# Patient Record
Sex: MALE | Race: WHITE | NOT HISPANIC OR LATINO | Employment: FULL TIME | URBAN - METROPOLITAN AREA
[De-identification: names, ages, dates, MRNs, and addresses within clinical notes are randomized per-mention and may not be internally consistent; named-entity substitution may affect disease eponyms.]

---

## 2024-06-09 ENCOUNTER — HOSPITAL ENCOUNTER (INPATIENT)
Facility: HOSPITAL | Age: 59
LOS: 1 days | Discharge: ANOTHER HOSPITAL | DRG: 270 | End: 2024-06-10
Attending: EMERGENCY MEDICINE | Admitting: STUDENT IN AN ORGANIZED HEALTH CARE EDUCATION/TRAINING PROGRAM
Payer: COMMERCIAL

## 2024-06-09 ENCOUNTER — APPOINTMENT (OUTPATIENT)
Dept: CT IMAGING | Facility: HOSPITAL | Age: 59
DRG: 270 | End: 2024-06-09
Payer: COMMERCIAL

## 2024-06-09 ENCOUNTER — APPOINTMENT (OUTPATIENT)
Dept: GENERAL RADIOLOGY | Facility: HOSPITAL | Age: 59
DRG: 270 | End: 2024-06-09
Payer: COMMERCIAL

## 2024-06-09 DIAGNOSIS — I21.4 NSTEMI (NON-ST ELEVATED MYOCARDIAL INFARCTION): Primary | ICD-10-CM

## 2024-06-09 LAB
ALBUMIN SERPL-MCNC: 4.4 G/DL (ref 3.5–5.2)
ALBUMIN/GLOB SERPL: 2.1 G/DL
ALP SERPL-CCNC: 67 U/L (ref 39–117)
ALT SERPL W P-5'-P-CCNC: 41 U/L (ref 1–41)
ANION GAP SERPL CALCULATED.3IONS-SCNC: 15.4 MMOL/L (ref 5–15)
APTT PPP: 24.5 SECONDS (ref 78–95.9)
AST SERPL-CCNC: 41 U/L (ref 1–40)
BASOPHILS # BLD AUTO: 0.06 10*3/MM3 (ref 0–0.2)
BASOPHILS NFR BLD AUTO: 0.4 % (ref 0–1.5)
BILIRUB SERPL-MCNC: 0.8 MG/DL (ref 0–1.2)
BUN SERPL-MCNC: 17 MG/DL (ref 6–20)
BUN/CREAT SERPL: 12.3 (ref 7–25)
CALCIUM SPEC-SCNC: 9.5 MG/DL (ref 8.6–10.5)
CHLORIDE SERPL-SCNC: 103 MMOL/L (ref 98–107)
CO2 SERPL-SCNC: 23.6 MMOL/L (ref 22–29)
CREAT SERPL-MCNC: 1.38 MG/DL (ref 0.76–1.27)
DEPRECATED RDW RBC AUTO: 37.7 FL (ref 37–54)
EGFRCR SERPLBLD CKD-EPI 2021: 59.6 ML/MIN/1.73
EOSINOPHIL # BLD AUTO: 0.06 10*3/MM3 (ref 0–0.4)
EOSINOPHIL NFR BLD AUTO: 0.4 % (ref 0.3–6.2)
ERYTHROCYTE [DISTWIDTH] IN BLOOD BY AUTOMATED COUNT: 12.5 % (ref 12.3–15.4)
GEN 5 2HR TROPONIN T REFLEX: 154 NG/L
GLOBULIN UR ELPH-MCNC: 2.1 GM/DL
GLUCOSE SERPL-MCNC: 120 MG/DL (ref 65–99)
HCT VFR BLD AUTO: 43.8 % (ref 37.5–51)
HGB BLD-MCNC: 15.3 G/DL (ref 13–17.7)
HOLD SPECIMEN: NORMAL
HOLD SPECIMEN: NORMAL
IMM GRANULOCYTES # BLD AUTO: 0.05 10*3/MM3 (ref 0–0.05)
IMM GRANULOCYTES NFR BLD AUTO: 0.4 % (ref 0–0.5)
INR PPP: 1.03 (ref 0.86–1.15)
LIPASE SERPL-CCNC: 51 U/L (ref 13–60)
LYMPHOCYTES # BLD AUTO: 1.81 10*3/MM3 (ref 0.7–3.1)
LYMPHOCYTES NFR BLD AUTO: 12.9 % (ref 19.6–45.3)
MAGNESIUM SERPL-MCNC: 1.8 MG/DL (ref 1.6–2.6)
MCH RBC QN AUTO: 29.1 PG (ref 26.6–33)
MCHC RBC AUTO-ENTMCNC: 34.9 G/DL (ref 31.5–35.7)
MCV RBC AUTO: 83.3 FL (ref 79–97)
MONOCYTES # BLD AUTO: 0.92 10*3/MM3 (ref 0.1–0.9)
MONOCYTES NFR BLD AUTO: 6.6 % (ref 5–12)
NEUTROPHILS NFR BLD AUTO: 11.13 10*3/MM3 (ref 1.7–7)
NEUTROPHILS NFR BLD AUTO: 79.3 % (ref 42.7–76)
NRBC BLD AUTO-RTO: 0 /100 WBC (ref 0–0.2)
NT-PROBNP SERPL-MCNC: 191.2 PG/ML (ref 0–900)
PLATELET # BLD AUTO: 223 10*3/MM3 (ref 140–450)
PMV BLD AUTO: 10.2 FL (ref 6–12)
POTASSIUM SERPL-SCNC: 4.1 MMOL/L (ref 3.5–5.2)
PROT SERPL-MCNC: 6.5 G/DL (ref 6–8.5)
PROTHROMBIN TIME: 13.8 SECONDS (ref 11.8–14.9)
RBC # BLD AUTO: 5.26 10*6/MM3 (ref 4.14–5.8)
SODIUM SERPL-SCNC: 142 MMOL/L (ref 136–145)
TROPONIN T DELTA: 83 NG/L
TROPONIN T SERPL HS-MCNC: 71 NG/L
TSH SERPL DL<=0.05 MIU/L-ACNC: 1.03 UIU/ML (ref 0.27–4.2)
WBC NRBC COR # BLD AUTO: 14.03 10*3/MM3 (ref 3.4–10.8)
WHOLE BLOOD HOLD COAG: NORMAL
WHOLE BLOOD HOLD SPECIMEN: NORMAL
WHOLE BLOOD HOLD SPECIMEN: NORMAL

## 2024-06-09 PROCEDURE — 25510000001 IOPAMIDOL PER 1 ML: Performed by: EMERGENCY MEDICINE

## 2024-06-09 PROCEDURE — 94799 UNLISTED PULMONARY SVC/PX: CPT

## 2024-06-09 PROCEDURE — 85610 PROTHROMBIN TIME: CPT | Performed by: EMERGENCY MEDICINE

## 2024-06-09 PROCEDURE — 36415 COLL VENOUS BLD VENIPUNCTURE: CPT | Performed by: STUDENT IN AN ORGANIZED HEALTH CARE EDUCATION/TRAINING PROGRAM

## 2024-06-09 PROCEDURE — 80050 GENERAL HEALTH PANEL: CPT | Performed by: EMERGENCY MEDICINE

## 2024-06-09 PROCEDURE — 83690 ASSAY OF LIPASE: CPT | Performed by: EMERGENCY MEDICINE

## 2024-06-09 PROCEDURE — 84484 ASSAY OF TROPONIN QUANT: CPT | Performed by: EMERGENCY MEDICINE

## 2024-06-09 PROCEDURE — 93010 ELECTROCARDIOGRAM REPORT: CPT | Performed by: INTERNAL MEDICINE

## 2024-06-09 PROCEDURE — 99291 CRITICAL CARE FIRST HOUR: CPT

## 2024-06-09 PROCEDURE — 71045 X-RAY EXAM CHEST 1 VIEW: CPT

## 2024-06-09 PROCEDURE — 83036 HEMOGLOBIN GLYCOSYLATED A1C: CPT | Performed by: STUDENT IN AN ORGANIZED HEALTH CARE EDUCATION/TRAINING PROGRAM

## 2024-06-09 PROCEDURE — 93005 ELECTROCARDIOGRAM TRACING: CPT

## 2024-06-09 PROCEDURE — 84484 ASSAY OF TROPONIN QUANT: CPT | Performed by: STUDENT IN AN ORGANIZED HEALTH CARE EDUCATION/TRAINING PROGRAM

## 2024-06-09 PROCEDURE — 25010000002 HYDROMORPHONE 1 MG/ML SOLUTION: Performed by: STUDENT IN AN ORGANIZED HEALTH CARE EDUCATION/TRAINING PROGRAM

## 2024-06-09 PROCEDURE — 71275 CT ANGIOGRAPHY CHEST: CPT

## 2024-06-09 PROCEDURE — 93005 ELECTROCARDIOGRAM TRACING: CPT | Performed by: EMERGENCY MEDICINE

## 2024-06-09 PROCEDURE — 83735 ASSAY OF MAGNESIUM: CPT | Performed by: EMERGENCY MEDICINE

## 2024-06-09 PROCEDURE — 25010000002 HEPARIN (PORCINE) 25000-0.45 UT/250ML-% SOLUTION: Performed by: EMERGENCY MEDICINE

## 2024-06-09 PROCEDURE — 25810000003 SODIUM CHLORIDE 0.9 % SOLUTION: Performed by: EMERGENCY MEDICINE

## 2024-06-09 PROCEDURE — 85730 THROMBOPLASTIN TIME PARTIAL: CPT | Performed by: EMERGENCY MEDICINE

## 2024-06-09 PROCEDURE — 99222 1ST HOSP IP/OBS MODERATE 55: CPT | Performed by: STUDENT IN AN ORGANIZED HEALTH CARE EDUCATION/TRAINING PROGRAM

## 2024-06-09 PROCEDURE — 83880 ASSAY OF NATRIURETIC PEPTIDE: CPT | Performed by: EMERGENCY MEDICINE

## 2024-06-09 RX ORDER — ONDANSETRON 2 MG/ML
4 INJECTION INTRAMUSCULAR; INTRAVENOUS EVERY 6 HOURS PRN
Status: DISCONTINUED | OUTPATIENT
Start: 2024-06-09 | End: 2024-06-10 | Stop reason: HOSPADM

## 2024-06-09 RX ORDER — NALOXONE HCL 0.4 MG/ML
0.4 VIAL (ML) INJECTION
Status: DISCONTINUED | OUTPATIENT
Start: 2024-06-09 | End: 2024-06-10 | Stop reason: HOSPADM

## 2024-06-09 RX ORDER — SODIUM CHLORIDE 9 MG/ML
250 INJECTION, SOLUTION INTRAVENOUS CONTINUOUS
Status: DISCONTINUED | OUTPATIENT
Start: 2024-06-09 | End: 2024-06-10

## 2024-06-09 RX ORDER — AMOXICILLIN 250 MG
2 CAPSULE ORAL 2 TIMES DAILY PRN
Status: DISCONTINUED | OUTPATIENT
Start: 2024-06-09 | End: 2024-06-10 | Stop reason: HOSPADM

## 2024-06-09 RX ORDER — ATORVASTATIN CALCIUM 40 MG/1
40 TABLET, FILM COATED ORAL NIGHTLY
Status: DISCONTINUED | OUTPATIENT
Start: 2024-06-09 | End: 2024-06-10 | Stop reason: HOSPADM

## 2024-06-09 RX ORDER — NITROGLYCERIN 0.4 MG/1
0.4 TABLET SUBLINGUAL
Status: DISCONTINUED | OUTPATIENT
Start: 2024-06-09 | End: 2024-06-10 | Stop reason: HOSPADM

## 2024-06-09 RX ORDER — SODIUM CHLORIDE 9 MG/ML
40 INJECTION, SOLUTION INTRAVENOUS AS NEEDED
Status: DISCONTINUED | OUTPATIENT
Start: 2024-06-09 | End: 2024-06-10 | Stop reason: HOSPADM

## 2024-06-09 RX ORDER — FAMOTIDINE 20 MG/1
40 TABLET, FILM COATED ORAL DAILY
Status: DISCONTINUED | OUTPATIENT
Start: 2024-06-10 | End: 2024-06-10

## 2024-06-09 RX ORDER — VALSARTAN AND HYDROCHLOROTHIAZIDE 320; 25 MG/1; MG/1
1 TABLET, FILM COATED ORAL DAILY
COMMUNITY
Start: 2024-06-06 | End: 2024-06-10 | Stop reason: HOSPADM

## 2024-06-09 RX ORDER — SODIUM CHLORIDE 0.9 % (FLUSH) 0.9 %
10 SYRINGE (ML) INJECTION AS NEEDED
Status: DISCONTINUED | OUTPATIENT
Start: 2024-06-09 | End: 2024-06-10 | Stop reason: HOSPADM

## 2024-06-09 RX ORDER — CARVEDILOL 25 MG/1
25 TABLET ORAL 2 TIMES DAILY WITH MEALS
Status: DISCONTINUED | OUTPATIENT
Start: 2024-06-10 | End: 2024-06-10

## 2024-06-09 RX ORDER — CALCIUM CARBONATE 500 MG/1
2 TABLET, CHEWABLE ORAL 2 TIMES DAILY PRN
Status: DISCONTINUED | OUTPATIENT
Start: 2024-06-09 | End: 2024-06-10 | Stop reason: HOSPADM

## 2024-06-09 RX ORDER — POLYETHYLENE GLYCOL 3350 17 G/17G
17 POWDER, FOR SOLUTION ORAL DAILY PRN
Status: DISCONTINUED | OUTPATIENT
Start: 2024-06-09 | End: 2024-06-10 | Stop reason: HOSPADM

## 2024-06-09 RX ORDER — AMLODIPINE BESYLATE 5 MG/1
1 TABLET ORAL DAILY
COMMUNITY
Start: 2024-04-16 | End: 2024-06-10 | Stop reason: HOSPADM

## 2024-06-09 RX ORDER — CARVEDILOL 25 MG/1
25 TABLET ORAL 2 TIMES DAILY WITH MEALS
COMMUNITY
Start: 2024-06-06

## 2024-06-09 RX ORDER — BISACODYL 10 MG
10 SUPPOSITORY, RECTAL RECTAL DAILY PRN
Status: DISCONTINUED | OUTPATIENT
Start: 2024-06-09 | End: 2024-06-10 | Stop reason: HOSPADM

## 2024-06-09 RX ORDER — ASPIRIN 81 MG/1
81 TABLET, CHEWABLE ORAL DAILY
Status: DISCONTINUED | OUTPATIENT
Start: 2024-06-10 | End: 2024-06-10 | Stop reason: HOSPADM

## 2024-06-09 RX ORDER — ACETAMINOPHEN 325 MG/1
650 TABLET ORAL EVERY 4 HOURS PRN
Status: DISCONTINUED | OUTPATIENT
Start: 2024-06-09 | End: 2024-06-10

## 2024-06-09 RX ORDER — HYDROCODONE BITARTRATE AND ACETAMINOPHEN 5; 325 MG/1; MG/1
1 TABLET ORAL EVERY 4 HOURS PRN
Status: DISCONTINUED | OUTPATIENT
Start: 2024-06-09 | End: 2024-06-10 | Stop reason: HOSPADM

## 2024-06-09 RX ORDER — HEPARIN SODIUM 10000 [USP'U]/100ML
10.5 INJECTION, SOLUTION INTRAVENOUS
Status: DISCONTINUED | OUTPATIENT
Start: 2024-06-09 | End: 2024-06-10

## 2024-06-09 RX ORDER — UREA 10 %
5 LOTION (ML) TOPICAL NIGHTLY PRN
Status: DISCONTINUED | OUTPATIENT
Start: 2024-06-09 | End: 2024-06-10 | Stop reason: HOSPADM

## 2024-06-09 RX ORDER — BISACODYL 5 MG/1
5 TABLET, DELAYED RELEASE ORAL DAILY PRN
Status: DISCONTINUED | OUTPATIENT
Start: 2024-06-09 | End: 2024-06-10 | Stop reason: HOSPADM

## 2024-06-09 RX ORDER — ASPIRIN 81 MG/1
324 TABLET, CHEWABLE ORAL ONCE
Status: DISCONTINUED | OUTPATIENT
Start: 2024-06-09 | End: 2024-06-10 | Stop reason: HOSPADM

## 2024-06-09 RX ORDER — SODIUM CHLORIDE 0.9 % (FLUSH) 0.9 %
10 SYRINGE (ML) INJECTION EVERY 12 HOURS SCHEDULED
Status: DISCONTINUED | OUTPATIENT
Start: 2024-06-09 | End: 2024-06-10 | Stop reason: HOSPADM

## 2024-06-09 RX ADMIN — IOPAMIDOL 100 ML: 755 INJECTION, SOLUTION INTRAVENOUS at 19:51

## 2024-06-09 RX ADMIN — SODIUM CHLORIDE 250 ML/HR: 9 INJECTION, SOLUTION INTRAVENOUS at 16:09

## 2024-06-09 RX ADMIN — Medication 10 ML: at 22:00

## 2024-06-09 RX ADMIN — HEPARIN SODIUM 10.5 UNITS/KG/HR: 10000 INJECTION, SOLUTION INTRAVENOUS at 17:18

## 2024-06-09 RX ADMIN — NITROGLYCERIN 0.5 INCH: 20 OINTMENT TOPICAL at 16:14

## 2024-06-09 RX ADMIN — HYDROMORPHONE HYDROCHLORIDE 0.5 MG: 1 INJECTION, SOLUTION INTRAMUSCULAR; INTRAVENOUS; SUBCUTANEOUS at 17:16

## 2024-06-09 NOTE — Clinical Note
First balloon inflation max pressure = 8 jorgito. First balloon inflation duration = 13 seconds. Second inflation of balloon - Max pressure = 13 jorgito. 2nd Inflation of balloon - Duration = 12 seconds. 2nd inflation was done at 05:39 EDT.

## 2024-06-09 NOTE — Clinical Note
First balloon inflation max pressure = 10 jorgito. First balloon inflation duration = 7 seconds. Second inflation of balloon - Max pressure = 14 jorgito. 2nd Inflation of balloon - Duration = 11 seconds. 2nd inflation was done at 05:51 EDT.

## 2024-06-09 NOTE — H&P
[facility]   HOSPITALIST HISTORY AND PHYSICAL  Date: 2024   Patient Name: Luis Farias  : 1965  MRN: [id number]  Primary Care Physician:  Horacio Baker, KARLO  Date of admission: 2024    Subjective   Subjective     Chief Complaint: Chest Pain    HPI:    Luis Farias is a 59 y.o. male with past medical history of CAD s/p CABG and hypertension.  Patient began having acute onset chest pain today.  He denies any previous episodes of chest pain within the last couple of weeks.  Denies shortness of breath or diaphoresis.  Initially did improve with nitroglycerin.  During my examination he is in acute distress with 9/10 chest pain.  He describes this as sharp and nonradiating.  He does report some back pain over the past week, current chest pain is not radiating to the back however.  He denies tobacco abuse or alcohol use.  EKG with some mild ST depression in lateral leads, troponin 71.  Cardiology consulted from the ER and recommends IV heparin drip for NSTEMI.  Hospitalist asked to admit.      Personal History     Past Medical History:  No past medical history on file.    Past Surgical History:  No past surgical history on file.     Family History:   No family history on file.     Social History:   Social History     Socioeconomic History   • Marital status:         Home Medications:       Allergies:  No Known Allergies    Review of Systems   All systems were reviewed and negative except for: Chest pain    Objective   Objective     Vitals:   Temp:  [97.6 °F (36.4 °C)-98 °F (36.7 °C)] 98 °F (36.7 °C)  Heart Rate:  [63] 63  Resp:  [18] 18  BP: (115-118)/(72-85) 118/85    Physical Exam    Constitutional: Awake, alert, moderate distress   Eyes: Pupils equal   HENT: mucous membranes moist   Neck: Supple   Respiratory: Can complete full sentences, no respiratory distress, nonlabored respirations    Cardiovascular: RRR on monitor   Gastrointestinal: soft, nontender,  nondistended   Musculoskeletal: No bilateral ankle edema   Psychiatric: Appropriate affect, cooperative   Neurologic: Oriented x 3, speech clear   Skin: No rashes     Result Review    Result Review:  I have personally reviewed the results from the time of this admission to 6/9/2024 17:08 EDT and agree with these findings:  [x]  Laboratory  [x]  Microbiology  [x]  Radiology  []  EKG/Telemetry   []  Cardiology/Vascular   []  Pathology  []  Old records  []  Other:      Assessment & Plan   Assessment / Plan     Assessment:  NSTEMI  Chest pain  Elevated troponin  COSTA  Leukocytosis  Hypertension  CAD s/p CABG      Plan:   Admit for inpatient to telemetry  Initial EKG with no ST changes.  Repeat EKG during active chest pain with some mild ST depression in V2/V3.  Troponin 71, repeat pending.  Continue to trend every 6h overnight.  IV heparin ordered  Discussed with cardiology in the ED, no immediate intervention required.  Aspirin and high intensity statin  Continue home beta-blocker carvedilol 25 mg twice daily  Hold home BP meds.  Lisinopril/hydrochlorothiazide and amlodipine.  Stat CTA to rule out dissection given back pain within the last week and sharp chest pain.  Cardiac diet.  N.p.o. at midnight.  Echo ordered  Zofran as needed  Norco 5 mg as needed.  Dilaudid 0.5 mg as needed.  CXR reviewed   Creatinine 1.38 on admission, baseline 1  WBC likely reactive from CP   AM labs       DVT prophylaxis:  Medical DVT prophylaxis orders are present.        CODE STATUS:    Code Status (Patient has no pulse and is not breathing): CPR (Attempt to Resuscitate)  Medical Interventions (Patient has pulse or is breathing): Full Support      Admission Status:  I believe this patient meets inpatient status.    Electronically signed by Ilana Allen DO, 06/09/24, 5:08 PM EDT.

## 2024-06-09 NOTE — ED PROVIDER NOTES
"Time: 3:41 PM EDT  Date of encounter:  6/9/2024  Independent Historian/Clinical History and Information was obtained by:   Patient    History is limited by: N/A    Chief Complaint: Chest pain      History of Present Illness:  Patient is a 59 y.o. year old male who presents to the emergency department for evaluation of left-sided chest pain that started while jessica today.  Patient denies shortness of breath and cough.  Patient has no nausea or vomiting.  Patient denies leg pain and swelling.  Patient has no fever or chills.    HPI    Patient Care Team  Primary Care Provider: Horacio Baker APRN    Past Medical History:     No Known Allergies  No past medical history on file.  No past surgical history on file.  No family history on file.    Home Medications:  Prior to Admission medications    Not on File        Social History:          Review of Systems:  Review of Systems   Constitutional:  Negative for chills and fever.   HENT:  Negative for congestion, rhinorrhea and sore throat.    Eyes:  Negative for pain and visual disturbance.   Respiratory:  Negative for apnea, cough, chest tightness and shortness of breath.    Cardiovascular:  Positive for chest pain. Negative for palpitations.   Gastrointestinal:  Negative for abdominal pain, diarrhea, nausea and vomiting.   Genitourinary:  Negative for difficulty urinating and dysuria.   Musculoskeletal:  Negative for joint swelling and myalgias.   Skin:  Negative for color change.   Neurological:  Negative for seizures and headaches.   Psychiatric/Behavioral: Negative.     All other systems reviewed and are negative.       Physical Exam:  /85   Pulse 63   Temp 98 °F (36.7 °C) (Oral)   Resp 18   Ht 180.3 cm (71\")   Wt 114 kg (251 lb 15.8 oz)   SpO2 100%   BMI 35.14 kg/m²     Physical Exam  Vitals and nursing note reviewed.   Constitutional:       General: He is not in acute distress.     Appearance: Normal appearance. He is not toxic-appearing.   HENT:      " Head: Normocephalic and atraumatic.      Jaw: There is normal jaw occlusion.   Eyes:      General: Lids are normal.      Extraocular Movements: Extraocular movements intact.      Conjunctiva/sclera: Conjunctivae normal.      Pupils: Pupils are equal, round, and reactive to light.   Cardiovascular:      Rate and Rhythm: Normal rate and regular rhythm.      Pulses: Normal pulses.      Heart sounds: Normal heart sounds.   Pulmonary:      Effort: Pulmonary effort is normal. No respiratory distress.      Breath sounds: Normal breath sounds. No wheezing or rhonchi.   Abdominal:      General: Abdomen is flat.      Palpations: Abdomen is soft.      Tenderness: There is no abdominal tenderness. There is no guarding or rebound.   Musculoskeletal:         General: Normal range of motion.      Cervical back: Normal range of motion and neck supple.      Right lower leg: No edema.      Left lower leg: No edema.   Skin:     General: Skin is warm and dry.   Neurological:      Mental Status: He is alert and oriented to person, place, and time. Mental status is at baseline.   Psychiatric:         Mood and Affect: Mood normal.                  Procedures:  Procedures      Medical Decision Making:      Comorbidities that affect care:    Coronary Artery Disease    External Notes reviewed:    Hospital Discharge Summary: Patient was last seen for coronary artery disease.      The following orders were placed and all results were independently analyzed by me:  Orders Placed This Encounter   Procedures    XR Chest 1 View    CT Angiogram Chest    [place] Draw    High Sensitivity Troponin T    Comprehensive Metabolic Panel    Lipase    BNP    Magnesium    CBC Auto Differential    High Sensitivity Troponin T 2Hr    Protime-INR    aPTT    aPTT    NPO Diet NPO Type: Strict NPO    Undress & Gown    Continuous Pulse Oximetry    Notify Provider Platelet Count Less Than 78403    Notify Provider if PTT Not in Therapeutic Range After 24 Hours    Stop  Infusion & Notify Provider if Bleeding Occurs    RN To Release aPTT Order 6 Hours After Heparin Bolus & 6 Hours After Any Heparin Rate Change    After 2 Consecutive Therapeutic aPTTs, Obtain aPTT Daily.  If a Rate Adjustment is Necessary, Resume Every 6 Hour aPTT Draws    Inpatient Cardiology Consult    Inpatient Hospitalist Consult    Oxygen Therapy- Nasal Cannula; Titrate 1-6 LPM Per SpO2; 90 - 95%    ECG 12 Lead ED Triage Standing Order; Chest Pain    ECG 12 Lead ED Triage Standing Order; Chest Pain    Insert Peripheral IV    CBC & Differential    Green Top (Gel)    Lavender Top    Gold Top - SST    Light Blue Top    CBC & Differential       Medications Given in the Emergency Department:  Medications   sodium chloride 0.9 % flush 10 mL (has no administration in time range)   aspirin chewable tablet 324 mg (0 mg Oral Hold 6/9/24 1528)   sodium chloride 0.9 % infusion (250 mL/hr Intravenous New Bag 6/9/24 1609)   HYDROmorphone (DILAUDID) injection 0.5 mg (has no administration in time range)   heparin 03841 units/250 mL (100 units/mL) in 0.45 % NaCl infusion (has no administration in time range)   heparin bolus from bag solution 4,000 Units (has no administration in time range)   heparin bolus from bag solution 2,000 Units (has no administration in time range)   nitroglycerin (NITROSTAT) ointment 0.5 inch (0.5 inches Topical Given 6/9/24 1614)        ED Course:         Labs:    Lab Results (last 24 hours)       Procedure Component Value Units Date/Time    High Sensitivity Troponin T [[id number]]  (Abnormal) Collected: 06/09/24 1448    Specimen: Blood from Arm, Left Updated: 06/09/24 1536     HS Troponin T 71 ng/L     Narrative:      High Sensitive Troponin T Reference Range:  <14.0 ng/L- Negative Female for AMI  <22.0 ng/L- Negative Male for AMI  >=14 - Abnormal Female indicating possible myocardial injury.  >=22 - Abnormal Male indicating possible myocardial injury.   Clinicians would have to utilize clinical  acumen, EKG, Troponin, and serial changes to determine if it is an Acute Myocardial Infarction or myocardial injury due to an underlying chronic condition.         CBC & Differential [[id number]]  (Abnormal) Collected: 06/09/24 1448    Specimen: Blood from Arm, Left Updated: 06/09/24 1457    Narrative:      The following orders were created for panel order CBC & Differential.  Procedure                               Abnormality         Status                     ---------                               -----------         ------                     CBC Auto Differential[[id number]]        Abnormal            Final result                 Please view results for these tests on the individual orders.    Comprehensive Metabolic Panel [[id number]]  (Abnormal) Collected: 06/09/24 1448    Specimen: Blood from Arm, Left Updated: 06/09/24 1518     Glucose 120 mg/dL      BUN 17 mg/dL      Creatinine 1.38 mg/dL      Sodium 142 mmol/L      Potassium 4.1 mmol/L      Chloride 103 mmol/L      CO2 23.6 mmol/L      Calcium 9.5 mg/dL      Total Protein 6.5 g/dL      Albumin 4.4 g/dL      ALT (SGPT) 41 U/L      AST (SGOT) 41 U/L      Alkaline Phosphatase 67 U/L      Total Bilirubin 0.8 mg/dL      Globulin 2.1 gm/dL      A/G Ratio 2.1 g/dL      BUN/Creatinine Ratio 12.3     Anion Gap 15.4 mmol/L      eGFR 59.6 mL/min/1.73     Narrative:      GFR Normal >60  Chronic Kidney Disease <60  Kidney Failure <15      Lipase [[id number]]  (Normal) Collected: 06/09/24 1448    Specimen: Blood from Arm, Left Updated: 06/09/24 1518     Lipase 51 U/L     BNP [[id number]]  (Normal) Collected: 06/09/24 1448    Specimen: Blood from Arm, Left Updated: 06/09/24 1514     proBNP 191.2 pg/mL     Narrative:      This assay is used as an aid in the diagnosis of individuals suspected of having heart failure. It can be used as an aid in the diagnosis of acute decompensated heart failure (ADHF) in patients presenting with signs and symptoms of ADHF to the emergency  department (ED). In addition, NT-proBNP of <300 pg/mL indicates ADHF is not likely.    Age Range Result Interpretation  NT-proBNP Concentration (pg/mL:      <50             Positive            >450                   Gray                 300-450                    Negative             <300    50-75           Positive            >900                  Gray                300-900                  Negative            <300      >75             Positive            >1800                  Gray                300-1800                  Negative            <300    Magnesium [[id number]]  (Normal) Collected: 06/09/24 1448    Specimen: Blood from Arm, Left Updated: 06/09/24 1518     Magnesium 1.8 mg/dL     CBC Auto Differential [[id number]]  (Abnormal) Collected: 06/09/24 1448    Specimen: Blood from Arm, Left Updated: 06/09/24 1457     WBC 14.03 10*3/mm3      RBC 5.26 10*6/mm3      Hemoglobin 15.3 g/dL      Hematocrit 43.8 %      MCV 83.3 fL      MCH 29.1 pg      MCHC 34.9 g/dL      RDW 12.5 %      RDW-SD 37.7 fl      MPV 10.2 fL      Platelets 223 10*3/mm3      Neutrophil % 79.3 %      Lymphocyte % 12.9 %      Monocyte % 6.6 %      Eosinophil % 0.4 %      Basophil % 0.4 %      Immature Grans % 0.4 %      Neutrophils, Absolute 11.13 10*3/mm3      Lymphocytes, Absolute 1.81 10*3/mm3      Monocytes, Absolute 0.92 10*3/mm3      Eosinophils, Absolute 0.06 10*3/mm3      Basophils, Absolute 0.06 10*3/mm3      Immature Grans, Absolute 0.05 10*3/mm3      nRBC 0.0 /100 WBC              Imaging:    XR Chest 1 View    Result Date: 6/9/2024  XR CHEST 1 VW Date of Exam: 6/9/2024 3:15 PM EDT Indication: Chest Pain Triage Protocol Comparison: CXR 7/11/2014 Findings: The heart is normal in size. The lungs are well-expanded. Subsegmental atelectasis and/or linear fibrosis is seen at the left lung base. Changes of coronary artery bypass are again appreciated. Bony structures appear intact.     Impression: Subsegmental atelectasis and/are linear  fibrosis is seen at the left lung base. Post coronary artery bypass. Electronically Signed: Ash Brady MD  6/9/2024 3:32 PM EDT  Workstation ID: [id number]       Differential Diagnosis and Discussion:    Chest Pain:  Based on the patient's signs and symptoms, I considered aortic dissection, myocardial infaction, pulmonary embolism, cardiac tamponade, pericarditis, pneumothorax, musculoskeletal chest pain and other differential diagnosis as an etiology of the patient's chest pain.     All labs were reviewed and interpreted by me.  All X-rays impressions were independently interpreted by me.  EKG was interpreted by me.    MDM     The patient´s CBC that was reviewed and interpreted by me shows no abnormalities of critical concern. Of note, there is no anemia requiring a blood transfusion and the platelet count is acceptable.  The patient´s CMP that was reviewed and interpretted by me shows no abnormalities of critical concern. Of note, the patient´s sodium and potassium are acceptable. The patient´s liver enzymes are unremarkable. The patient´s renal function (creatinine) is preserved. The patient has a normal anion gap.  Troponin is 71.    Patient was placed on the cardiac monitor after given heparin.  They were monitored for ventricular ectopy, arrhythmia, tachycardia, hypoxia, and changes in blood pressure.  Patient was rechecked several times throughout their stay.  Critical Care Note: Total Critical Care time of 45 minutes. Total critical care time documented does not include time spent on separately billed procedures for services of nurses or physician assistants. I personally saw and examined the patient. I have reviewed all diagnostic interpretations and treatment plans as written. I was present for the key portions of any procedures performed and the inclusive time noted in any critical care statement. Critical care time includes patient management by me, time spent at the patients bedside,  time to review  lab and imaging results, discussing patient care, documentation in the medical record, and time spent with family or caregiver.        Patient Care Considerations:    None      Consultants/Shared Management Plan:    Case was discussed with Dr. Gonzalez who agrees to consult.  Case was also discussed with Dr. Sow who recommends starting on heparin.  Case was discussed with Dr. Allen who agrees to admit the patient.    Social Determinants of Health:    Patient is independent, reliable, and has access to care.       Disposition and Care Coordination:    Admit:   Through independent evaluation of the patient's history, physical, and imperical data, the patient meets criteria for inpatient admission to the hospital.        Final diagnoses:   NSTEMI (non-ST elevated myocardial infarction)        ED Disposition       ED Disposition   Decision to Admit    Condition   --    Comment   --               This medical record created using voice recognition software.             Aron Morin MD  06/09/24 2200

## 2024-06-09 NOTE — Clinical Note
First balloon inflation max pressure = 7 jorgito. First balloon inflation duration = 15 seconds. Second inflation of balloon - Max pressure = 8 jorgito. 2nd Inflation of balloon - Duration = 21 seconds. 2nd inflation was done at 05:30 EDT. Third inflation of balloon - Max pressure = 12 jorgito. 3rd Inflation of balloon - Duration = 10 seconds. 3rd inflation was done at 05:31 EDT.

## 2024-06-09 NOTE — Clinical Note
First balloon inflation max pressure = 13 jorgito. First balloon inflation duration = 14 seconds. Second inflation of balloon - Max pressure = 14 jorgito. 2nd Inflation of balloon - Duration = 11 seconds. 2nd inflation was done at 06:20 EDT. Third inflation of balloon - Max pressure = 16 jorgito. 3rd Inflation of balloon - Duration = 12 seconds. 3rd inflation was done at 06:22 EDT.

## 2024-06-09 NOTE — Clinical Note
Staff delivered patient to lab.  Consents and History and Physical not obtained due to patient condition.

## 2024-06-09 NOTE — ED NOTES
Patient arrived via [facility] was jessica this morning and began having cp, patient has hx of CABG & MI - 2 nitros taken at home - 324 aspirin - pain 6/10 at this time.   MI - 2014 20 LFA.

## 2024-06-10 ENCOUNTER — APPOINTMENT (OUTPATIENT)
Dept: CARDIOLOGY | Facility: HOSPITAL | Age: 59
DRG: 270 | End: 2024-06-10
Payer: COMMERCIAL

## 2024-06-10 ENCOUNTER — APPOINTMENT (OUTPATIENT)
Dept: GENERAL RADIOLOGY | Facility: HOSPITAL | Age: 59
DRG: 270 | End: 2024-06-10
Payer: COMMERCIAL

## 2024-06-10 VITALS
BODY MASS INDEX: 34.97 KG/M2 | HEART RATE: 94 BPM | DIASTOLIC BLOOD PRESSURE: 79 MMHG | WEIGHT: 249.78 LBS | TEMPERATURE: 101.7 F | RESPIRATION RATE: 22 BRPM | OXYGEN SATURATION: 92 % | SYSTOLIC BLOOD PRESSURE: 108 MMHG | HEIGHT: 71 IN

## 2024-06-10 LAB
ACT BLD: 226 SECONDS (ref 89–137)
ACT BLD: 275 SECONDS (ref 89–137)
ACT BLD: 568 SECONDS (ref 89–137)
ALBUMIN SERPL-MCNC: 4 G/DL (ref 3.5–5.2)
ALBUMIN/GLOB SERPL: 1.7 G/DL
ALP SERPL-CCNC: 67 U/L (ref 39–117)
ALT SERPL W P-5'-P-CCNC: 97 U/L (ref 1–41)
ANION GAP SERPL CALCULATED.3IONS-SCNC: 10.8 MMOL/L (ref 5–15)
ANION GAP SERPL CALCULATED.3IONS-SCNC: 18.9 MMOL/L (ref 5–15)
ANISOCYTOSIS BLD QL: ABNORMAL
APTT PPP: 38.7 SECONDS (ref 78–95.9)
ARTERIAL PATENCY WRIST A: ABNORMAL
ARTERIAL PATENCY WRIST A: POSITIVE
AST SERPL-CCNC: 249 U/L (ref 1–40)
ATMOSPHERIC PRESS: 738.1 MMHG
ATMOSPHERIC PRESS: 738.7 MMHG
BASE EXCESS BLDA CALC-SCNC: -3.9 MMOL/L (ref -2–2)
BASE EXCESS BLDA CALC-SCNC: -5 MMOL/L (ref -2–2)
BASOPHILS # BLD AUTO: 0.03 10*3/MM3 (ref 0–0.2)
BASOPHILS # BLD AUTO: 0.1 10*3/MM3 (ref 0–0.2)
BASOPHILS NFR BLD AUTO: 0.2 % (ref 0–1.5)
BASOPHILS NFR BLD AUTO: 0.4 % (ref 0–1.5)
BDY SITE: ABNORMAL
BDY SITE: ABNORMAL
BH CV ECHO MEAS - AO MAX PG: 2.7 MMHG
BH CV ECHO MEAS - AO MEAN PG: 1.52 MMHG
BH CV ECHO MEAS - AO V2 MAX: 82.8 CM/SEC
BH CV ECHO MEAS - AO V2 VTI: 13.5 CM
BH CV ECHO MEAS - AVA(I,D): 2.02 CM2
BH CV ECHO MEAS - EDV(CUBED): 93 ML
BH CV ECHO MEAS - EDV(MOD-SP2): 73.6 ML
BH CV ECHO MEAS - EDV(MOD-SP4): 100 ML
BH CV ECHO MEAS - EF(MOD-BP): 21.3 %
BH CV ECHO MEAS - EF(MOD-SP2): 15.5 %
BH CV ECHO MEAS - EF(MOD-SP4): 24.1 %
BH CV ECHO MEAS - ESV(CUBED): 56.5 ML
BH CV ECHO MEAS - ESV(MOD-SP2): 62.2 ML
BH CV ECHO MEAS - ESV(MOD-SP4): 75.9 ML
BH CV ECHO MEAS - FS: 15.3 %
BH CV ECHO MEAS - IVS/LVPW: 0.89 CM
BH CV ECHO MEAS - IVSD: 0.94 CM
BH CV ECHO MEAS - LA DIMENSION: 4.1 CM
BH CV ECHO MEAS - LAT PEAK E' VEL: 11.4 CM/SEC
BH CV ECHO MEAS - LV DIASTOLIC VOL/BSA (35-75): 43.3 CM2
BH CV ECHO MEAS - LV MASS(C)D: 155.4 GRAMS
BH CV ECHO MEAS - LV MAX PG: 1.2 MMHG
BH CV ECHO MEAS - LV MEAN PG: 0.74 MMHG
BH CV ECHO MEAS - LV SYSTOLIC VOL/BSA (12-30): 32.8 CM2
BH CV ECHO MEAS - LV V1 MAX: 54.8 CM/SEC
BH CV ECHO MEAS - LV V1 VTI: 8.5 CM
BH CV ECHO MEAS - LVIDD: 4.5 CM
BH CV ECHO MEAS - LVIDS: 3.8 CM
BH CV ECHO MEAS - LVOT AREA: 3.2 CM2
BH CV ECHO MEAS - LVOT DIAM: 2.02 CM
BH CV ECHO MEAS - LVPWD: 1.06 CM
BH CV ECHO MEAS - MED PEAK E' VEL: 4.7 CM/SEC
BH CV ECHO MEAS - MV A MAX VEL: 25.3 CM/SEC
BH CV ECHO MEAS - MV DEC SLOPE: 410.7 CM/SEC2
BH CV ECHO MEAS - MV DEC TIME: 0.17 SEC
BH CV ECHO MEAS - MV E MAX VEL: 69.4 CM/SEC
BH CV ECHO MEAS - MV E/A: 2.7
BH CV ECHO MEAS - RVDD: 5 CM
BH CV ECHO MEAS - SV(LVOT): 27.3 ML
BH CV ECHO MEAS - SV(MOD-SP2): 11.4 ML
BH CV ECHO MEAS - SV(MOD-SP4): 24.1 ML
BH CV ECHO MEAS - SVI(LVOT): 11.8 ML/M2
BH CV ECHO MEAS - SVI(MOD-SP2): 4.9 ML/M2
BH CV ECHO MEAS - SVI(MOD-SP4): 10.4 ML/M2
BH CV ECHO MEAS - TAPSE (>1.6): 1.37 CM
BH CV ECHO MEAS - TR MAX PG: 22.1 MMHG
BH CV ECHO MEAS - TR MAX VEL: 234.8 CM/SEC
BH CV ECHO MEASUREMENTS AVERAGE E/E' RATIO: 8.62
BILIRUB SERPL-MCNC: 1 MG/DL (ref 0–1.2)
BUN SERPL-MCNC: 18 MG/DL (ref 6–20)
BUN SERPL-MCNC: 20 MG/DL (ref 6–20)
BUN/CREAT SERPL: 15.4 (ref 7–25)
BUN/CREAT SERPL: 17.5 (ref 7–25)
CALCIUM SPEC-SCNC: 8.4 MG/DL (ref 8.6–10.5)
CALCIUM SPEC-SCNC: 8.4 MG/DL (ref 8.6–10.5)
CHLORIDE SERPL-SCNC: 101 MMOL/L (ref 98–107)
CHLORIDE SERPL-SCNC: 104 MMOL/L (ref 98–107)
CHOLEST SERPL-MCNC: 168 MG/DL (ref 0–200)
CO2 SERPL-SCNC: 20.1 MMOL/L (ref 22–29)
CO2 SERPL-SCNC: 23.2 MMOL/L (ref 22–29)
CREAT SERPL-MCNC: 1.03 MG/DL (ref 0.76–1.27)
CREAT SERPL-MCNC: 1.3 MG/DL (ref 0.76–1.27)
D-LACTATE SERPL-SCNC: 1.2 MMOL/L
D-LACTATE SERPL-SCNC: 1.5 MMOL/L (ref 0.5–2)
D-LACTATE SERPL-SCNC: 8.4 MMOL/L (ref 0.5–2)
DEPRECATED RDW RBC AUTO: 39.7 FL (ref 37–54)
DEPRECATED RDW RBC AUTO: 41.4 FL (ref 37–54)
EGFRCR SERPLBLD CKD-EPI 2021: 64.1 ML/MIN/1.73
EGFRCR SERPLBLD CKD-EPI 2021: 84.7 ML/MIN/1.73
EOSINOPHIL # BLD AUTO: 0.01 10*3/MM3 (ref 0–0.4)
EOSINOPHIL # BLD AUTO: 0.03 10*3/MM3 (ref 0–0.4)
EOSINOPHIL NFR BLD AUTO: 0.1 % (ref 0.3–6.2)
EOSINOPHIL NFR BLD AUTO: 0.1 % (ref 0.3–6.2)
ERYTHROCYTE [DISTWIDTH] IN BLOOD BY AUTOMATED COUNT: 13 % (ref 12.3–15.4)
ERYTHROCYTE [DISTWIDTH] IN BLOOD BY AUTOMATED COUNT: 13.1 % (ref 12.3–15.4)
GLOBULIN UR ELPH-MCNC: 2.3 GM/DL
GLUCOSE BLDC GLUCOMTR-MCNC: 144 MG/DL (ref 70–99)
GLUCOSE SERPL-MCNC: 149 MG/DL (ref 65–99)
GLUCOSE SERPL-MCNC: 262 MG/DL (ref 65–99)
HBA1C MFR BLD: 5.6 % (ref 4.8–5.6)
HCO3 BLDA-SCNC: 22.2 MMOL/L (ref 22–26)
HCO3 BLDA-SCNC: 23.7 MMOL/L (ref 22–26)
HCT VFR BLD AUTO: 43.1 % (ref 37.5–51)
HCT VFR BLD AUTO: 48.7 % (ref 37.5–51)
HCT VFR BLD CALC: 42 % (ref 38–51)
HCT VFR BLD CALC: 46 % (ref 38–51)
HDLC SERPL-MCNC: 41 MG/DL (ref 40–60)
HEMODILUTION: NO
HEMODILUTION: NO
HGB BLD-MCNC: 15 G/DL (ref 13–17.7)
HGB BLD-MCNC: 16.3 G/DL (ref 13–17.7)
HGB BLDA-MCNC: 14.4 G/DL (ref 12–18)
HGB BLDA-MCNC: 15.6 G/DL (ref 12–18)
IMM GRANULOCYTES # BLD AUTO: 0.05 10*3/MM3 (ref 0–0.05)
IMM GRANULOCYTES # BLD AUTO: 0.22 10*3/MM3 (ref 0–0.05)
IMM GRANULOCYTES NFR BLD AUTO: 0.3 % (ref 0–0.5)
IMM GRANULOCYTES NFR BLD AUTO: 0.9 % (ref 0–0.5)
INHALED O2 CONCENTRATION: 60 %
INHALED O2 CONCENTRATION: 90 %
LARGE PLATELETS: ABNORMAL
LDLC SERPL CALC-MCNC: 106 MG/DL (ref 0–100)
LDLC/HDLC SERPL: 2.53 {RATIO}
LEFT ATRIUM VOLUME INDEX: 19.6 ML/M2
LYMPHOCYTES # BLD AUTO: 1.93 10*3/MM3 (ref 0.7–3.1)
LYMPHOCYTES # BLD AUTO: 6.57 10*3/MM3 (ref 0.7–3.1)
LYMPHOCYTES # BLD MANUAL: 7.3 10*3/MM3 (ref 0.7–3.1)
LYMPHOCYTES NFR BLD AUTO: 13.5 % (ref 19.6–45.3)
LYMPHOCYTES NFR BLD AUTO: 26.1 % (ref 19.6–45.3)
Lab: ABNORMAL
Lab: ABNORMAL
MAGNESIUM SERPL-MCNC: 1.7 MG/DL (ref 1.6–2.6)
MCH RBC QN AUTO: 29.4 PG (ref 26.6–33)
MCH RBC QN AUTO: 29.5 PG (ref 26.6–33)
MCHC RBC AUTO-ENTMCNC: 33.5 G/DL (ref 31.5–35.7)
MCHC RBC AUTO-ENTMCNC: 34.8 G/DL (ref 31.5–35.7)
MCV RBC AUTO: 84.7 FL (ref 79–97)
MCV RBC AUTO: 87.7 FL (ref 79–97)
MODALITY: ABNORMAL
MODALITY: ABNORMAL
MONOCYTES # BLD AUTO: 0.93 10*3/MM3 (ref 0.1–0.9)
MONOCYTES # BLD AUTO: 2.42 10*3/MM3 (ref 0.1–0.9)
MONOCYTES NFR BLD AUTO: 6.5 % (ref 5–12)
MONOCYTES NFR BLD AUTO: 9.6 % (ref 5–12)
NEUTROPHILS # BLD AUTO: 17.86 10*3/MM3 (ref 1.7–7)
NEUTROPHILS NFR BLD AUTO: 11.39 10*3/MM3 (ref 1.7–7)
NEUTROPHILS NFR BLD AUTO: 15.82 10*3/MM3 (ref 1.7–7)
NEUTROPHILS NFR BLD AUTO: 62.9 % (ref 42.7–76)
NEUTROPHILS NFR BLD AUTO: 79.4 % (ref 42.7–76)
NEUTROPHILS NFR BLD MANUAL: 70 % (ref 42.7–76)
NEUTS BAND NFR BLD MANUAL: 1 % (ref 0–5)
NOTIFIED WHO: ABNORMAL
NOTIFIED WHO: ABNORMAL
NRBC BLD AUTO-RTO: 0 /100 WBC (ref 0–0.2)
NRBC BLD AUTO-RTO: 0 /100 WBC (ref 0–0.2)
PCO2 BLDA: 47.6 MM HG (ref 35–45)
PCO2 BLDA: 51.9 MM HG (ref 35–45)
PEEP RESPIRATORY: 5 CM[H2O]
PH BLDA: 7.27 PH UNITS (ref 7.35–7.45)
PH BLDA: 7.28 PH UNITS (ref 7.35–7.45)
PHOSPHATE SERPL-MCNC: 2.8 MG/DL (ref 2.5–4.5)
PLATELET # BLD AUTO: 209 10*3/MM3 (ref 140–450)
PLATELET # BLD AUTO: 302 10*3/MM3 (ref 140–450)
PMV BLD AUTO: 10.3 FL (ref 6–12)
PMV BLD AUTO: 10.5 FL (ref 6–12)
PO2 BLD: 121 MM[HG] (ref 0–500)
PO2 BLD: 85 MM[HG] (ref 0–500)
PO2 BLDA: 72.8 MM HG (ref 80–100)
PO2 BLDA: 76.8 MM HG (ref 80–100)
POIKILOCYTOSIS BLD QL SMEAR: ABNORMAL
POTASSIUM SERPL-SCNC: 3.8 MMOL/L (ref 3.5–5.2)
POTASSIUM SERPL-SCNC: 4.1 MMOL/L (ref 3.5–5.2)
PROT SERPL-MCNC: 6.3 G/DL (ref 6–8.5)
RBC # BLD AUTO: 5.09 10*6/MM3 (ref 4.14–5.8)
RBC # BLD AUTO: 5.55 10*6/MM3 (ref 4.14–5.8)
READ BACK: YES
READ BACK: YES
RESPIRATORY RATE: 14
SAO2 % BLDCOA: 92.1 % (ref 95–99)
SAO2 % BLDCOA: 92.9 % (ref 95–99)
SODIUM SERPL-SCNC: 138 MMOL/L (ref 136–145)
SODIUM SERPL-SCNC: 140 MMOL/L (ref 136–145)
TRIGL SERPL-MCNC: 116 MG/DL (ref 0–150)
TROPONIN T SERPL HS-MCNC: 2313 NG/L
TROPONIN T SERPL HS-MCNC: 793 NG/L
VARIANT LYMPHS NFR BLD MANUAL: 25 % (ref 19.6–45.3)
VARIANT LYMPHS NFR BLD MANUAL: 4 % (ref 0–5)
VENTILATOR MODE: AC
VLDLC SERPL-MCNC: 21 MG/DL (ref 5–40)
VT ON VENT VENT: 500 ML
WBC MORPH BLD: NORMAL
WBC NRBC COR # BLD AUTO: 14.34 10*3/MM3 (ref 3.4–10.8)
WBC NRBC COR # BLD AUTO: 25.16 10*3/MM3 (ref 3.4–10.8)
WHOLE BLOOD HOLD COAG: NORMAL

## 2024-06-10 PROCEDURE — 0BH17EZ INSERTION OF ENDOTRACHEAL AIRWAY INTO TRACHEA, VIA NATURAL OR ARTIFICIAL OPENING: ICD-10-PCS | Performed by: STUDENT IN AN ORGANIZED HEALTH CARE EDUCATION/TRAINING PROGRAM

## 2024-06-10 PROCEDURE — C1725 CATH, TRANSLUMIN NON-LASER: HCPCS | Performed by: INTERNAL MEDICINE

## 2024-06-10 PROCEDURE — 93010 ELECTROCARDIOGRAM REPORT: CPT | Performed by: INTERNAL MEDICINE

## 2024-06-10 PROCEDURE — 83735 ASSAY OF MAGNESIUM: CPT | Performed by: STUDENT IN AN ORGANIZED HEALTH CARE EDUCATION/TRAINING PROGRAM

## 2024-06-10 PROCEDURE — 25010000002 HEPARIN (PORCINE) PER 1000 UNITS: Performed by: INTERNAL MEDICINE

## 2024-06-10 PROCEDURE — 94002 VENT MGMT INPAT INIT DAY: CPT

## 2024-06-10 PROCEDURE — C1757 CATH, THROMBECTOMY/EMBOLECT: HCPCS | Performed by: INTERNAL MEDICINE

## 2024-06-10 PROCEDURE — 94799 UNLISTED PULMONARY SVC/PX: CPT

## 2024-06-10 PROCEDURE — C1894 INTRO/SHEATH, NON-LASER: HCPCS | Performed by: INTERNAL MEDICINE

## 2024-06-10 PROCEDURE — 82948 REAGENT STRIP/BLOOD GLUCOSE: CPT

## 2024-06-10 PROCEDURE — 99291 CRITICAL CARE FIRST HOUR: CPT | Performed by: INTERNAL MEDICINE

## 2024-06-10 PROCEDURE — 25010000002 MIDAZOLAM PER 1MG: Performed by: STUDENT IN AN ORGANIZED HEALTH CARE EDUCATION/TRAINING PROGRAM

## 2024-06-10 PROCEDURE — 92973 PRQ TRLUML C MCHN ASP THRMBC: CPT | Performed by: INTERNAL MEDICINE

## 2024-06-10 PROCEDURE — 25010000002 CANGRELOR TETRASODIUM 50 MG RECONSTITUTED SOLUTION 1 EACH VIAL: Performed by: INTERNAL MEDICINE

## 2024-06-10 PROCEDURE — 93306 TTE W/DOPPLER COMPLETE: CPT

## 2024-06-10 PROCEDURE — 83605 ASSAY OF LACTIC ACID: CPT

## 2024-06-10 PROCEDURE — 25010000002 PROPOFOL 10 MG/ML EMULSION: Performed by: INTERNAL MEDICINE

## 2024-06-10 PROCEDURE — 25010000002 HYDROMORPHONE 1 MG/ML SOLUTION: Performed by: STUDENT IN AN ORGANIZED HEALTH CARE EDUCATION/TRAINING PROGRAM

## 2024-06-10 PROCEDURE — 0 DEXTROSE 5 % SOLUTION 1,000 ML FLEX CONT: Performed by: STUDENT IN AN ORGANIZED HEALTH CARE EDUCATION/TRAINING PROGRAM

## 2024-06-10 PROCEDURE — 85025 COMPLETE CBC W/AUTO DIFF WBC: CPT | Performed by: STUDENT IN AN ORGANIZED HEALTH CARE EDUCATION/TRAINING PROGRAM

## 2024-06-10 PROCEDURE — 82803 BLOOD GASES ANY COMBINATION: CPT

## 2024-06-10 PROCEDURE — C1887 CATHETER, GUIDING: HCPCS | Performed by: INTERNAL MEDICINE

## 2024-06-10 PROCEDURE — C1769 GUIDE WIRE: HCPCS | Performed by: INTERNAL MEDICINE

## 2024-06-10 PROCEDURE — 92950 HEART/LUNG RESUSCITATION CPR: CPT

## 2024-06-10 PROCEDURE — 92941 PRQ TRLML REVSC TOT OCCL AMI: CPT | Performed by: INTERNAL MEDICINE

## 2024-06-10 PROCEDURE — 93005 ELECTROCARDIOGRAM TRACING: CPT | Performed by: STUDENT IN AN ORGANIZED HEALTH CARE EDUCATION/TRAINING PROGRAM

## 2024-06-10 PROCEDURE — 25010000002 AMIODARONE PER 30 MG: Performed by: INTERNAL MEDICINE

## 2024-06-10 PROCEDURE — C9606 PERC D-E COR REVASC W AMI S: HCPCS | Performed by: INTERNAL MEDICINE

## 2024-06-10 PROCEDURE — 71045 X-RAY EXAM CHEST 1 VIEW: CPT

## 2024-06-10 PROCEDURE — 25010000002 SULFUR HEXAFLUORIDE MICROSPH 60.7-25 MG RECONSTITUTED SUSPENSION: Performed by: STUDENT IN AN ORGANIZED HEALTH CARE EDUCATION/TRAINING PROGRAM

## 2024-06-10 PROCEDURE — C9460 INJECTION, CANGRELOR: HCPCS | Performed by: INTERNAL MEDICINE

## 2024-06-10 PROCEDURE — 87070 CULTURE OTHR SPECIMN AEROBIC: CPT | Performed by: STUDENT IN AN ORGANIZED HEALTH CARE EDUCATION/TRAINING PROGRAM

## 2024-06-10 PROCEDURE — 25010000002 EPINEPHRINE 1 MG/10ML SOLUTION PREFILLED SYRINGE: Performed by: STUDENT IN AN ORGANIZED HEALTH CARE EDUCATION/TRAINING PROGRAM

## 2024-06-10 PROCEDURE — 80061 LIPID PANEL: CPT | Performed by: STUDENT IN AN ORGANIZED HEALTH CARE EDUCATION/TRAINING PROGRAM

## 2024-06-10 PROCEDURE — 027034Z DILATION OF CORONARY ARTERY, ONE ARTERY WITH DRUG-ELUTING INTRALUMINAL DEVICE, PERCUTANEOUS APPROACH: ICD-10-PCS | Performed by: INTERNAL MEDICINE

## 2024-06-10 PROCEDURE — 06CQ3ZZ EXTIRPATION OF MATTER FROM LEFT SAPHENOUS VEIN, PERCUTANEOUS APPROACH: ICD-10-PCS | Performed by: INTERNAL MEDICINE

## 2024-06-10 PROCEDURE — 25010000002 NALOXONE PER 1 MG: Performed by: STUDENT IN AN ORGANIZED HEALTH CARE EDUCATION/TRAINING PROGRAM

## 2024-06-10 PROCEDURE — 25810000003 SODIUM CHLORIDE 0.9 % SOLUTION 250 ML FLEX CONT: Performed by: INTERNAL MEDICINE

## 2024-06-10 PROCEDURE — 93459 L HRT ART/GRFT ANGIO: CPT | Performed by: INTERNAL MEDICINE

## 2024-06-10 PROCEDURE — 4A023N7 MEASUREMENT OF CARDIAC SAMPLING AND PRESSURE, LEFT HEART, PERCUTANEOUS APPROACH: ICD-10-PCS | Performed by: INTERNAL MEDICINE

## 2024-06-10 PROCEDURE — 84100 ASSAY OF PHOSPHORUS: CPT | Performed by: STUDENT IN AN ORGANIZED HEALTH CARE EDUCATION/TRAINING PROGRAM

## 2024-06-10 PROCEDURE — 85730 THROMBOPLASTIN TIME PARTIAL: CPT | Performed by: STUDENT IN AN ORGANIZED HEALTH CARE EDUCATION/TRAINING PROGRAM

## 2024-06-10 PROCEDURE — 93005 ELECTROCARDIOGRAM TRACING: CPT | Performed by: INTERNAL MEDICINE

## 2024-06-10 PROCEDURE — 94761 N-INVAS EAR/PLS OXIMETRY MLT: CPT

## 2024-06-10 PROCEDURE — 99223 1ST HOSP IP/OBS HIGH 75: CPT | Performed by: INTERNAL MEDICINE

## 2024-06-10 PROCEDURE — 87205 SMEAR GRAM STAIN: CPT | Performed by: STUDENT IN AN ORGANIZED HEALTH CARE EDUCATION/TRAINING PROGRAM

## 2024-06-10 PROCEDURE — 36600 WITHDRAWAL OF ARTERIAL BLOOD: CPT

## 2024-06-10 PROCEDURE — 84484 ASSAY OF TROPONIN QUANT: CPT | Performed by: STUDENT IN AN ORGANIZED HEALTH CARE EDUCATION/TRAINING PROGRAM

## 2024-06-10 PROCEDURE — 85007 BL SMEAR W/DIFF WBC COUNT: CPT | Performed by: STUDENT IN AN ORGANIZED HEALTH CARE EDUCATION/TRAINING PROGRAM

## 2024-06-10 PROCEDURE — 25810000003 SODIUM CHLORIDE 0.9 % SOLUTION: Performed by: INTERNAL MEDICINE

## 2024-06-10 PROCEDURE — 25010000002 AMIODARONE IN DEXTROSE 5% 360-4.14 MG/200ML-% SOLUTION: Performed by: INTERNAL MEDICINE

## 2024-06-10 PROCEDURE — 85347 COAGULATION TIME ACTIVATED: CPT

## 2024-06-10 PROCEDURE — 25010000002 SUCCINYLCHOLINE PER 20 MG: Performed by: STUDENT IN AN ORGANIZED HEALTH CARE EDUCATION/TRAINING PROGRAM

## 2024-06-10 PROCEDURE — 99239 HOSP IP/OBS DSCHRG MGMT >30: CPT | Performed by: STUDENT IN AN ORGANIZED HEALTH CARE EDUCATION/TRAINING PROGRAM

## 2024-06-10 PROCEDURE — 25010000002 PROPOFOL 10 MG/ML EMULSION: Performed by: STUDENT IN AN ORGANIZED HEALTH CARE EDUCATION/TRAINING PROGRAM

## 2024-06-10 PROCEDURE — C1874 STENT, COATED/COV W/DEL SYS: HCPCS | Performed by: INTERNAL MEDICINE

## 2024-06-10 PROCEDURE — 83605 ASSAY OF LACTIC ACID: CPT | Performed by: STUDENT IN AN ORGANIZED HEALTH CARE EDUCATION/TRAINING PROGRAM

## 2024-06-10 PROCEDURE — 80053 COMPREHEN METABOLIC PANEL: CPT | Performed by: STUDENT IN AN ORGANIZED HEALTH CARE EDUCATION/TRAINING PROGRAM

## 2024-06-10 PROCEDURE — 99291 CRITICAL CARE FIRST HOUR: CPT | Performed by: STUDENT IN AN ORGANIZED HEALTH CARE EDUCATION/TRAINING PROGRAM

## 2024-06-10 PROCEDURE — 25510000001 IOPAMIDOL PER 1 ML: Performed by: INTERNAL MEDICINE

## 2024-06-10 DEVICE — XIENCE SKYPOINT™ EVEROLIMUS ELUTING CORONARY STENT SYSTEM 2.75 MM X 23 MM / RAPID-EXCHANGE
Type: IMPLANTABLE DEVICE | Status: FUNCTIONAL
Brand: XIENCE SKYPOINT™

## 2024-06-10 RX ORDER — ATORVASTATIN CALCIUM 40 MG/1
40 TABLET, FILM COATED ORAL NIGHTLY
Start: 2024-06-10

## 2024-06-10 RX ORDER — SUCCINYLCHOLINE CHLORIDE 20 MG/ML
INJECTION INTRAMUSCULAR; INTRAVENOUS
Status: COMPLETED | OUTPATIENT
Start: 2024-06-10 | End: 2024-06-10

## 2024-06-10 RX ORDER — FAMOTIDINE 40 MG/1
40 TABLET, FILM COATED ORAL DAILY
Start: 2024-06-11

## 2024-06-10 RX ORDER — MIDAZOLAM HYDROCHLORIDE 2 MG/2ML
4 INJECTION, SOLUTION INTRAMUSCULAR; INTRAVENOUS ONCE
Status: COMPLETED | OUTPATIENT
Start: 2024-06-10 | End: 2024-06-10

## 2024-06-10 RX ORDER — NOREPINEPHRINE BITARTRATE 0.03 MG/ML
INJECTION, SOLUTION INTRAVENOUS
Status: COMPLETED | OUTPATIENT
Start: 2024-06-10 | End: 2024-06-10

## 2024-06-10 RX ORDER — LIDOCAINE HYDROCHLORIDE 20 MG/ML
INJECTION, SOLUTION INFILTRATION; PERINEURAL
Status: DISCONTINUED | OUTPATIENT
Start: 2024-06-10 | End: 2024-06-10 | Stop reason: HOSPADM

## 2024-06-10 RX ORDER — FAMOTIDINE 20 MG/1
40 TABLET, FILM COATED ORAL DAILY
Status: DISCONTINUED | OUTPATIENT
Start: 2024-06-10 | End: 2024-06-10 | Stop reason: HOSPADM

## 2024-06-10 RX ORDER — ALBUTEROL SULFATE 2.5 MG/3ML
2.5 SOLUTION RESPIRATORY (INHALATION) ONCE AS NEEDED
Status: DISCONTINUED | OUTPATIENT
Start: 2024-06-10 | End: 2024-06-10

## 2024-06-10 RX ORDER — AMIODARONE HYDROCHLORIDE 150 MG/3ML
INJECTION, SOLUTION INTRAVENOUS
Status: DISCONTINUED | OUTPATIENT
Start: 2024-06-10 | End: 2024-06-10 | Stop reason: HOSPADM

## 2024-06-10 RX ORDER — ASPIRIN 81 MG/1
TABLET, CHEWABLE ORAL
Status: DISCONTINUED | OUTPATIENT
Start: 2024-06-10 | End: 2024-06-10 | Stop reason: HOSPADM

## 2024-06-10 RX ORDER — HEPARIN SODIUM 1000 [USP'U]/ML
INJECTION, SOLUTION INTRAVENOUS; SUBCUTANEOUS
Status: DISCONTINUED | OUTPATIENT
Start: 2024-06-10 | End: 2024-06-10 | Stop reason: HOSPADM

## 2024-06-10 RX ORDER — ACETAMINOPHEN 325 MG/1
650 TABLET ORAL EVERY 4 HOURS PRN
Status: DISCONTINUED | OUTPATIENT
Start: 2024-06-10 | End: 2024-06-10 | Stop reason: HOSPADM

## 2024-06-10 RX ORDER — SODIUM CHLORIDE 9 MG/ML
100 INJECTION, SOLUTION INTRAVENOUS CONTINUOUS
Status: DISCONTINUED | OUTPATIENT
Start: 2024-06-10 | End: 2024-06-10

## 2024-06-10 RX ORDER — CARVEDILOL 25 MG/1
25 TABLET ORAL 2 TIMES DAILY WITH MEALS
Status: DISCONTINUED | OUTPATIENT
Start: 2024-06-10 | End: 2024-06-10 | Stop reason: HOSPADM

## 2024-06-10 RX ORDER — SODIUM CHLORIDE FOR INHALATION 3 %
4 VIAL, NEBULIZER (ML) INHALATION ONCE AS NEEDED
Status: DISCONTINUED | OUTPATIENT
Start: 2024-06-10 | End: 2024-06-10

## 2024-06-10 RX ORDER — FENTANYL CITRATE-0.9 % NACL/PF 10 MCG/ML
50-300 PLASTIC BAG, INJECTION (ML) INTRAVENOUS
Start: 2024-06-10 | End: 2024-06-17

## 2024-06-10 RX ORDER — KETAMINE HYDROCHLORIDE 100 MG/ML
200 INJECTION, SOLUTION INTRAMUSCULAR; INTRAVENOUS ONCE
Status: DISCONTINUED | OUTPATIENT
Start: 2024-06-10 | End: 2024-06-10

## 2024-06-10 RX ORDER — ASPIRIN 81 MG/1
81 TABLET, CHEWABLE ORAL DAILY
Start: 2024-06-11

## 2024-06-10 RX ORDER — MIDAZOLAM HYDROCHLORIDE 2 MG/2ML
INJECTION, SOLUTION INTRAMUSCULAR; INTRAVENOUS
Status: COMPLETED | OUTPATIENT
Start: 2024-06-10 | End: 2024-06-10

## 2024-06-10 RX ORDER — ETOMIDATE 2 MG/ML
INJECTION INTRAVENOUS
Status: COMPLETED | OUTPATIENT
Start: 2024-06-10 | End: 2024-06-10

## 2024-06-10 RX ORDER — NOREPINEPHRINE BITARTRATE 0.03 MG/ML
.02-.3 INJECTION, SOLUTION INTRAVENOUS
Status: DISCONTINUED | OUTPATIENT
Start: 2024-06-10 | End: 2024-06-10 | Stop reason: HOSPADM

## 2024-06-10 RX ORDER — FENTANYL CITRATE-0.9 % NACL/PF 10 MCG/ML
50-300 PLASTIC BAG, INJECTION (ML) INTRAVENOUS
Status: DISCONTINUED | OUTPATIENT
Start: 2024-06-10 | End: 2024-06-10 | Stop reason: HOSPADM

## 2024-06-10 RX ORDER — NOREPINEPHRINE BITARTRATE 0.03 MG/ML
.02-.3 INJECTION, SOLUTION INTRAVENOUS
Start: 2024-06-10

## 2024-06-10 RX ADMIN — FAMOTIDINE 40 MG: 20 TABLET ORAL at 08:34

## 2024-06-10 RX ADMIN — SUCCINYLCHOLINE CHLORIDE 100 MG: 20 INJECTION, SOLUTION INTRAMUSCULAR; INTRAVENOUS at 02:23

## 2024-06-10 RX ADMIN — Medication 100 MCG/HR: at 03:12

## 2024-06-10 RX ADMIN — SODIUM BICARBONATE 75 MEQ: 84 INJECTION, SOLUTION INTRAVENOUS at 06:04

## 2024-06-10 RX ADMIN — MIDAZOLAM HYDROCHLORIDE 4 MG: 1 INJECTION, SOLUTION INTRAMUSCULAR; INTRAVENOUS at 03:09

## 2024-06-10 RX ADMIN — HYDROMORPHONE HYDROCHLORIDE 0.5 MG: 1 INJECTION, SOLUTION INTRAMUSCULAR; INTRAVENOUS; SUBCUTANEOUS at 02:08

## 2024-06-10 RX ADMIN — PROPOFOL 40 MCG/KG/MIN: 10 INJECTION, EMULSION INTRAVENOUS at 15:36

## 2024-06-10 RX ADMIN — ETOMIDATE 20 MG: 40 INJECTION, SOLUTION INTRAVENOUS at 02:23

## 2024-06-10 RX ADMIN — SODIUM CHLORIDE 100 ML/HR: 9 INJECTION, SOLUTION INTRAVENOUS at 07:59

## 2024-06-10 RX ADMIN — NALOXONE HYDROCHLORIDE 0.4 MG: 0.4 INJECTION, SOLUTION INTRAMUSCULAR; INTRAVENOUS; SUBCUTANEOUS at 02:58

## 2024-06-10 RX ADMIN — Medication 50 MCG/HR: at 09:46

## 2024-06-10 RX ADMIN — PROPOFOL 50 MCG/KG/MIN: 10 INJECTION, EMULSION INTRAVENOUS at 03:12

## 2024-06-10 RX ADMIN — PROPOFOL 60 MCG/KG/MIN: 10 INJECTION, EMULSION INTRAVENOUS at 05:15

## 2024-06-10 RX ADMIN — MIDAZOLAM HYDROCHLORIDE 2 MG: 1 INJECTION, SOLUTION INTRAMUSCULAR; INTRAVENOUS at 02:28

## 2024-06-10 RX ADMIN — ACETAMINOPHEN 650 MG: 325 TABLET ORAL at 13:21

## 2024-06-10 RX ADMIN — AMIODARONE HYDROCHLORIDE 1 MG/MIN: 1.8 INJECTION, SOLUTION INTRAVENOUS at 09:44

## 2024-06-10 RX ADMIN — SULFUR HEXAFLUORIDE 2 ML: KIT at 09:48

## 2024-06-10 RX ADMIN — PROPOFOL 40 MCG/KG/MIN: 10 INJECTION, EMULSION INTRAVENOUS at 13:27

## 2024-06-10 RX ADMIN — PROPOFOL 40 MCG/KG/MIN: 10 INJECTION, EMULSION INTRAVENOUS at 09:44

## 2024-06-10 RX ADMIN — EPINEPHRINE 1 MG: 0.1 INJECTION INTRAVENOUS at 02:19

## 2024-06-10 NOTE — NURSING NOTE
Patient stated he was having chest pressure of 8/10. Patient requested Dilaudid for pain. Nitroglycerin patch on left side of chest. Patient stated pain was relieved. Shortly thereafter patient vomited and became unresponsive. IV Narcan given and code blue called. Chest compressions started. [facility] team arrived to unit. Patient intubated. ROSC achieved.

## 2024-06-10 NOTE — CONSULTS
[facility]   Interventional Cardiology Consult Note    Patient Name: Luis Farias  : 1965  MRN: [id number]  Primary Care Physician:  Horacio Baker APRN  Referring Physician: Carlos Diaz MD  Primary cardiologist : Nahomi Gonzalez MD    Date of admission: 2024    Subjective   Subjective     Reason for Consultation : Cardiac arrest, polymorphic ventricular tachycardia, non-STEMI    Chief Complaint : Chest pain    Most of the history is obtained by talking to other providers reviewed the medical records.  Patient is intubated and sedated at the time of my examination in the Cath Lab.    HPI:  Luis Farias is a 59 y.o. male with coronary artery disease, previous CABG in .  He presented to the emergency room in the afternoon on 2024 because of chest pain.  The pain was apparently of acute onset while he was at work.  EKGs and in the ER showed mild ST segment depression lateral leads.  High sensitive troponin was elevated.  He was started on medication including IV heparin for the management of non-STEMI.  A CT scan of the chest was performed which ruled out pulmonary embolism.  He had intermittent episodes of chest pain since admission.  After 2 AM today patient reported having significant chest pressure.  He received Dilaudid.  Subsequently he had an episode of vomiting and became unresponsive.  Telemetry showed polymorphic ventricular tachycardia.  CODE BLUE was called he was intubated with return of spontaneous circulation shortly afterwards after very brief CPR.  Repeat EKG showed right bundle branch block which is new along with diffuse ST segment depression in leads.  Interventional cardiology was consulted at this time to evaluate the patient for emergent cardiac catheterization due to hemodynamic instability and cardiac arrest in the setting of non-STEMI.    Per patient's wife, he had back pains for the past 3 to 4 days which were mild.  However symptoms got really worse today  hence he decided come to the ER.  There was no shortness of breath or diaphoresis.      Review of Systems   Unable to obtain due to intubated status    Personal History     Coronary artery disease, previous CABG    Family History: Unable to obtain    Social History:  reports that he has never smoked. He has never used smokeless tobacco. He reports that he does not drink alcohol and does not use drugs.    Home Medications:  amLODIPine, carvedilol, and valsartan-hydrochlorothiazide    Allergies:  No Known Allergies    Objective    Objective     Vitals:   Temp:  [97.2 °F (36.2 °C)-99.6 °F (37.6 °C)] 99.6 °F (37.6 °C)  Heart Rate:  [0-142] 102  Resp:  [0-26] 24  BP: (0-127)/(0-87) 112/82  FiO2 (%):  [60 %-100 %] 60 %      Physical Exam:   Constitutional: Intubated, sedated   Eyes: PERRLA, sclerae anicteric, no conjunctival injection   HENT: NCAT, mucous membranes moist   Neck: Supple, no thyromegaly, no lymphadenopathy, trachea midline   Respiratory: Clear to auscultation bilaterally, nonlabored respirations    Cardiovascular: RRR, no murmurs, rubs, or gallops, palpable pedal pulses bilaterally   Gastrointestinal: Positive bowel sounds, soft, nontender, nondistended   Musculoskeletal: No bilateral ankle edema, no clubbing or cyanosis to extremities   Skin: No rashes     Result Review    Result Review:  I have personally reviewed the results from the time of this admission to 6/10/2024 04:22 EDT and agree with these findings:  [x]  Laboratory  []  Microbiology  [x]  Radiology  [x]  EKG/Telemetry   [x]  Cardiology/Vascular   []  Pathology  [x]  Old records  []  Other:  Most notable findings include:     CMP          6/9/2024    14:48 6/10/2024    01:12 6/10/2024    03:16   CMP   Glucose 120  149  262    BUN 17  18  20    Creatinine 1.38  1.03  1.30    EGFR 59.6  84.7  64.1    Sodium 142  138  140    Potassium 4.1  3.8  4.1    Chloride 103  104  101    Calcium 9.5  8.4  8.4    Total Protein 6.5   6.3    Albumin 4.4   4.0     Globulin 2.1   2.3    Total Bilirubin 0.8   1.0    Alkaline Phosphatase 67   67    AST (SGOT) 41   249    ALT (SGPT) 41   97    Albumin/Globulin Ratio 2.1   1.7    BUN/Creatinine Ratio 12.3  17.5  15.4    Anion Gap 15.4  10.8  18.9       CBC          6/9/2024    14:48 6/10/2024    01:12 6/10/2024    03:16   CBC   WBC 14.03  14.34  25.16    RBC 5.26  5.09  5.55    Hemoglobin 15.3  15.0  16.3    Hematocrit 43.8  43.1  48.7    MCV 83.3  84.7  87.7    MCH 29.1  29.5  29.4    MCHC 34.9  34.8  33.5    RDW 12.5  13.0  13.1    Platelets 223  209  302        Latest Reference Range & Units 06/09/24 14:48 06/09/24 17:06 06/09/24 23:27 06/10/24 03:16   HS Troponin T <22 ng/L 71 (C) 154 (C) 793 (C) 2,313 (C)   Troponin T Delta >=-4 - <+4 ng/L  83 (C)     proBNP 0.0 - 900.0 pg/mL 191.2          EKG done at 3:19 AM after CODE BLUE showed sinus tachycardia, right bundle branch block, significant ST depressions in anterior and inferior leads      EKG done earlier in the day showed sinus rhythm, normal axis, ST segment depressions in lead V3 to V6 with a 0.5 mm ST segment elevation in lead aVL.    Assessment & Plan   Assessment / Plan     Brief Patient Summary:  Luis Farias is a 59 y.o. male with coronary artery disease, previous CABG in 2014.  He presented the emergency room last afternoon because of chest pain.    Active Hospital Problems:  Active Hospital Problems    Diagnosis    • **NSTEMI (non-ST elevated myocardial infarction)      Non-STEMI : Send with chest pain.  Initial EKG in the ER showed minimal ST segment depressions in the lateral leads.  Cardiac markers mildly elevated on admission, subsequently trended up.  He had an episode of polymorphic ventricular tachycardia followed by cardiac arrest requiring brief CPR and intubation.  Subsequent EKGs showed right bundle branch block and more prominent ST segment changes, suggestive for ischemic origin.    Plan:     We will proceed with emergent cardiac catheterization  at this time to delineate coronary anatomy and graft anatomy with angioplasty if indicated due to presentation with non-STEMI and cardiac arrhythmia leading to cardiac arrest.    Will initiate amiodarone bolus, followed by infusion  Further management plans based on cath outcomes.    Electronically signed by Raimundo Sow MD, 06/10/24, 4:22 AM EDT.

## 2024-06-10 NOTE — NURSING NOTE
Upon arrival to the ICU, patient become combative, erratic and begin flailing in bed attempting to pull ET tube. IV access lost in the process. Per Dr. Diaz, IM versed given and IO access obtained in left tibia. New IV access obtained and sedation was started. OG and villa was placed. EKG obtained. Dr. Sow called by Dr. Diaz, Code STEMI called at [phone].

## 2024-06-10 NOTE — PLAN OF CARE
Goal Outcome Evaluation:  Plan of Care Reviewed With: patient        Progress: no change  Outcome Evaluation: Patient is intubated on AC/VC rate 14, vt 500, peep 5, 100% we monitor patient and wean oxygen throughout the day as tolerated.                                ROS: CONTUSIONAL: +fever, chills Denies , fatigue, wt loss. HEAD: Denies trauma, HA, Dizziness. EYE: Denies Acute visual changes, diplopia. ENMT: +sore throat Denies change in hearing, tinnitus, epistaxis, difficulty swallowing, . CARDIO: Denies CP, palpitations, edema. RESP: Denies Cough, SOB , Diff breathing, hemoptysis. GI: Denies N/V, ABD pain, change in bowel movement. URINARY: Denies difficulty urinating, pelvic pain. MS:  Denies joint pain, back pain, weakness, decreased ROM, swelling. NEURO: Denies change in gait, seizures, loss of sensation, dizziness, confusion LOC.  PSY: NO SI/HI. SKIN: Denies Rash, bruising.

## 2024-06-10 NOTE — CONSULTS
Cardiac Rehab Phase I - Occurrence #1    Education Topics:  Cardiac Rehab yes     Topic Components:  Exercise yes     Teaching Aids:  Phase 2 Brochure yes     Teaching Method:  Written Instruction yes     Teaching Recipient:  Patient yes       Recovery/Discharge Instructions:  Cardiac Rehab Phase 2 yes       Patient Qualification:  Cardiac Rehab Phase 2 yes   Pt is being transferred to [place] for his care. Will contact him at later date when his condition is stable.  Cardiac rehab information mailed to him at this time.

## 2024-06-10 NOTE — CONSULTS
[facility]   Consult Note    Patient Name: Luis Farias  : 1965  MRN: [id number]  Primary Care Physician:  Horacio Baker APRN  Referring Physician: No ref. provider found  Date of admission: 2024    Consults  Subjective   Subjective     Reason for Consult/ Chief Complaint: Reason for consultation critical care management    History of Present Illness  Luis Farias is a 59 y.o. male critically ill in the ICU  Had cardiac arrest overnight  Currently intubated  On ventilator  Remains on norepinephrine    Review of Systems     Personal History     History reviewed. No pertinent past medical history.    History reviewed. No pertinent surgical history.    Family History: family history is not on file. Otherwise pertinent FHx was reviewed and not pertinent to current issue.    Social History:  reports that he has never smoked. He has never used smokeless tobacco. He reports that he does not drink alcohol and does not use drugs.    Home Medications:   amLODIPine, carvedilol, and valsartan-hydrochlorothiazide    Allergies:  No Known Allergies    Objective    Objective     Vitals:  Temp:  [97.2 °F (36.2 °C)-100.9 °F (38.3 °C)] 100.9 °F (38.3 °C)  Heart Rate:  [0-142] 92  Resp:  [0-26] 18  BP: (0-127)/(0-87) 112/82  FiO2 (%):  [60 %-100 %] 70 %    Physical Exam  Critically ill  Intubated  Sedated  Extremities are warm to touch  Result Review    Result Review:  I have personally reviewed the results from the time of this admission to 6/10/2024 12:56 EDT and agree with these findings:  []  Laboratory  []  Microbiology  []  Radiology  []  EKG/Telemetry   []  Cardiology/Vascular   []  Pathology  []  Old records  []  Other:    Most notable findings include: Chest x-ray showing evidence of pulmonary edema    Assessment & Plan   Assessment / Plan     Brief Patient Summary:  Luis Farias is a 59 y.o. male who critically ill in the ICU    Active Hospital Problems:  Active Hospital Problems    Diagnosis      **NSTEMI (non-ST elevated myocardial infarction)    Status postcardiac arrest  Coronary artery disease  Non-STEMI  Acute decompensated systolic heart failure  Polymorphic ventricular tachycardia  Solitary pulmonary nodule    Plan:   Ventilator settings changed to assist-control tidal volume of 500 this is a little over the 60 mL/kg however we are trying to blow off excess CO2 at this time, respiratory rate of 18, PEEP increased to 10    Amiodarone for the ventricular arrhythmia    Follow electrolytes closely    Goal magnesium of 2, potassium of 4, phosphorus of 3    Antiplatelet therapy and anticoagulation per cardiology service    Propofol and fentanyl for sedation    Levophed to maintain mean arterial pressure of 65    Patient with solitary pulmonary nodule will need repeat CT scan in 6 months    Patient critically ill in the ICU status postcardiac arrest, with cardiogenic shock congestive heart failure and arrhythmia    Total critical care time spent 35 minutes    Electronically signed by Aaron Barbour DO, 06/10/24, 12:59 PM EDT.      Electronically signed by Aaron Barbour DO, 06/10/24, 12:56 PM EDT.

## 2024-06-10 NOTE — PROCEDURES
ENDOTRACHEAL INTUBATION    INDICATION: Cardiac arrest, Hypoxic Respiratory Failure    CONSENT: EMERGENT    SEDATION:  Etomidate 20mg  Succinylcholine 100mg    PRE-PROCEDURE EXAM:  Patient had cardiac arrest. Blue in face. ROSC was obtained and patient was moving. Not speaking and not oriented. Gasping for air.    PROCEDURE DETAILS:  Patient preoxygenated to >90%. Using video laryngoscope, vocal chords were visualized, #8.0 tube inserted through the vocal chords. Color capnography confirmed blue to yellow. Tube secured at 24cm on lip. Bilateral breath sounds auscultated. CXR confirmed placement.     POST-PROCEDURE EXAM: O2 saturation> 90 with bagging    RECOMMENDATIONS  Transfer to ICU on Vent    COMPLICATIONS  None    Time of procedure: Less than 1 minute

## 2024-06-10 NOTE — PLAN OF CARE
Goal Outcome Evaluation:      Transfer to [facility]                                          Problem: Adult Inpatient Plan of Care  Goal: Plan of Care Review  Outcome: Unable to Meet, Plan Revised  Goal: Patient-Specific Goal (Individualized)  Outcome: Unable to Meet, Plan Revised  Goal: Absence of Hospital-Acquired Illness or Injury  Outcome: Unable to Meet, Plan Revised  Goal: Optimal Comfort and Wellbeing  Outcome: Unable to Meet, Plan Revised  Goal: Readiness for Transition of Care  Outcome: Unable to Meet, Plan Revised     Problem: Chest Pain  Goal: Resolution of Chest Pain Symptoms  Outcome: Unable to Meet, Plan Revised     Problem: Restraint, Nonviolent  Goal: Absence of Harm or Injury  Outcome: Unable to Meet, Plan Revised     Problem: Skin Injury Risk Increased  Goal: Skin Health and Integrity  Outcome: Unable to Meet, Plan Revised

## 2024-06-10 NOTE — DISCHARGE SUMMARY
[facility]         HOSPITALIST  DISCHARGE SUMMARY    Patient Name: Luis Farias  : 1965  MRN: [id number]    Date of Admission: 2024  Date of Discharge:  6/10/2024    Primary Care Physician: Horacio Baker APRN    Consults       Date and Time Order Name Status Description    2024  5:19 PM Inpatient Cardiology Consult      2024  3:40 PM Inpatient Hospitalist Consult      2024  3:40 PM Inpatient Cardiology Consult              Active and Resolved Hospital Problems:  Active Hospital Problems    Diagnosis POA    **NSTEMI (non-ST elevated myocardial infarction) [I21.4] Yes      Resolved Hospital Problems   No resolved problems to display.       Hospital Course     Hospital Course:  Luis Farias is a 59 y.o. male with past medical history of CAD s/p CABG and hypertension.  Patient began having acute onset chest presented to the emergency room for evaluation.  Initially did improve with nitroglycerin.  EKG with no acute changes, troponin 71.  He continued to have severe chest pain, repeat EKG with some mild ST depression in lateral leads.  Troponin trended up to 154 on recheck.  Cardiology consulted and recommends IV heparin drip for NSTEMI.  Once admitted patient continued to have chest pain and increasing tropes.  He had an episode of emesis and then became unresponsive, CODE BLUE was called, he was noted to be in V. tach and received 1 round of defibrillation.  ROSC was achieved and he was transported to the ICU where he was intubated.  He was taken for emergent left heart cath with stent placement to SVG to diagonal branch.  He was initiated on IV amiodarone with right femoral artery sheath in place.  Echo with severely reduced EF around 20% and dilated left ventricle.  Due to high risk recommended that he transfer to tertiary care center.    Case was discussed with Dr. Justin Oleary, advanced heart failure specialist at [facility] who  accepted the patient for transfer.    Patient discharged in stable condition.    DISCHARGE Follow Up Recommendations for labs and diagnostics: Patient will follow-up with his primary cardiologist upon discharge from tertiary care center.      Day of Discharge     Vital Signs:  Temp:  [97.2 °F (36.2 °C)-101.5 °F (38.6 °C)] 101.5 °F (38.6 °C)  Heart Rate:  [0-142] 93  Resp:  [0-26] 21  BP: (0-127)/(0-87) 112/82  FiO2 (%):  [60 %-100 %] 70 %    Physical Exam:   Gen: NAD, sedated  Cards: RRR, on telemetry  Pulm: Intubated, no rhonchi  Abd: soft, nondistended  Extremities: no pitting edema      Discharge Details        Discharge Medications        New Medications        Instructions Start Date   amiodarone in dextrose 5% infusion  Commonly known as: NEXTERONE   0.5 mg/min (0.5 mg/min), Intravenous, Continuous      aspirin 81 MG chewable tablet   81 mg, Oral, Daily   Start Date: June 11, 2024     atorvastatin 40 MG tablet  Commonly known as: LIPITOR   40 mg, Oral, Nightly      famotidine 40 MG tablet  Commonly known as: PEPCID   40 mg, Oral, Daily   Start Date: June 11, 2024     fentaNYL citrate 1-0.9 MG/100ML-% solution    mcg/hr ( mcg/hr), Intravenous, Titrated      Norepinephrine-Sodium Chloride 8-0.9 MG/250ML-% solution infusion  Commonly known as: LEVOPHED   0.02-0.3 mcg/kg/min (2.26-33.9 mcg/min), Intravenous, Titrated      propofol 10 mg/mL emulsion infusion  Commonly known as: DIPRIVAN   5-50 mcg/kg/min (565-5,650 mcg/min), Intravenous, Titrated      ticagrelor 90 MG tablet tablet  Commonly known as: BRILINTA   90 mg, Nasogastric, Every 12 Hours Scheduled             Continue These Medications        Instructions Start Date   carvedilol 25 MG tablet  Commonly known as: COREG   25 mg, Oral, 2 Times Daily With Meals             Stop These Medications      amLODIPine 5 MG tablet  Commonly known as: NORVASC     valsartan-hydrochlorothiazide 320-25 MG per tablet  Commonly known as: DIOVAN-HCT               No Known Allergies    Discharge Disposition:  Short Term Hospital (DC - External)    Diet:  Hospital:  Diet Order   Procedures    NPO Diet NPO Type: Strict NPO       Discharge Activity:       CODE STATUS:  Code Status and Medical Interventions:   Ordered at: 06/09/24 1707     Code Status (Patient has no pulse and is not breathing):    CPR (Attempt to Resuscitate)     Medical Interventions (Patient has pulse or is breathing):    Full Support         No future appointments.        Pertinent  and/or Most Recent Results         LAB RESULTS:      Lab 06/10/24  0806 06/10/24  0636 06/10/24  0316 06/10/24  0112 06/09/24  1448   WBC  --   --  25.16* 14.34* 14.03*   HEMOGLOBIN  --   --  16.3 15.0 15.3   HEMATOCRIT  --   --  48.7 43.1 43.8   PLATELETS  --   --  302 209 223   NEUTROS ABS  --   --  15.82*  17.86* 11.39* 11.13*   IMMATURE GRANS (ABS)  --   --  0.22* 0.05 0.05   LYMPHS ABS  --   --  6.57* 1.93 1.81   MONOS ABS  --   --  2.42* 0.93* 0.92*   EOS ABS  --   --  0.03 0.01 0.06   MCV  --   --  87.7 84.7 83.3   LACTATE 1.5 1.2 8.4*  --   --    PROTIME  --   --   --   --  13.8   APTT  --   --   --  38.7* 24.5*         Lab 06/10/24  0316 06/10/24  0112 06/09/24  1706 06/09/24  1448   SODIUM 140 138  --  142   POTASSIUM 4.1 3.8  --  4.1   CHLORIDE 101 104  --  103   CO2 20.1* 23.2  --  23.6   ANION GAP 18.9* 10.8  --  15.4*   BUN 20 18  --  17   CREATININE 1.30* 1.03  --  1.38*   EGFR 64.1 84.7  --  59.6*   GLUCOSE 262* 149*  --  120*   CALCIUM 8.4* 8.4*  --  9.5   MAGNESIUM  --  1.7  --  1.8   PHOSPHORUS  --  2.8  --   --    HEMOGLOBIN A1C  --   --   --  5.60   TSH  --   --  1.030  --          Lab 06/10/24  0316 06/09/24  1448   TOTAL PROTEIN 6.3 6.5   ALBUMIN 4.0 4.4   GLOBULIN 2.3 2.1   ALT (SGPT) 97* 41   AST (SGOT) 249* 41*   BILIRUBIN 1.0 0.8   ALK PHOS 67 67   LIPASE  --  51         Lab 06/10/24  0316 06/09/24  2327 06/09/24  1706 06/09/24  1448   PROBNP  --   --   --  191.2   HSTROP T 2,313* 793* 154* 71*    PROTIME  --   --   --  13.8   INR  --   --   --  1.03         Lab 06/10/24  0112   CHOLESTEROL 168   LDL CHOL 106*   HDL CHOL 41   TRIGLYCERIDES 116             Lab 06/10/24  0636 06/10/24  0343   PH, ARTERIAL 7.267* 7.276*   PCO2, ARTERIAL 51.9* 47.6*   PO2 ART 76.8* 72.8*   O2 SATURATION ART 92.9* 92.1*   FIO2 90 60   HCO3 ART 23.7 22.2   BASE EXCESS ART -3.9* -5.0*     Brief Urine Lab Results       None          Microbiology Results (last 10 days)       Procedure Component Value - Date/Time    Respiratory Culture - Sputum, ET Suction [[id number]] Collected: 06/10/24 0307    Lab Status: Preliminary result Specimen: Sputum from ET Suction Updated: 06/10/24 0513     Gram Stain Few (2+) WBCs seen      Rare (1+) Epithelial cells seen            XR Chest 1 View    Result Date: 6/10/2024  Well-positioned tubes. Interval development of vascular congestion and pulmonary edema. Electronically Signed: Buster Cuello MD  6/10/2024 3:24 AM EDT  Workstation ID: [id number]    CT Angiogram Chest    Result Date: 6/9/2024  1. No aortic aneurysm or aortic dissection. No pulmonary embolism. 2. Status post CABG. 3. Scarring/atelectasis in the lungs. Additionally, there is some subtle septal thickening that may reflect mild volume overload. 4. Evidence of mild bronchitis. 5. Noncalcified pulmonary nodules in the lower lobes as above. Indeterminate solid pulmonary nodule measuring 6 mm. In a patient of unknown risk level with a solid nodule of 6-8 mm, recommend CT at 6-12 months. In a low-risk patient, then consider CT at 18-24 months. In a high-risk patient, if the nodule is stable at 6-12 months, recommend CT at 18-24 months. Electronically Signed: Buster Cuello MD  6/9/2024 8:07 PM EDT  Workstation ID: [id number]    XR Chest 1 View    Result Date: 6/9/2024  Impression: Subsegmental atelectasis and/are linear fibrosis is seen at the left lung base. Post coronary artery bypass. Electronically Signed: Ash Brady MD  6/9/2024 3:32  PM EDT  Workstation ID: [id number]                Labs Pending at Discharge:  Pending Labs       Order Current Status    Respiratory Culture - Sputum, ET Suction Preliminary result              Time spent on Discharge including face to face service:  >30 minutes    Electronically signed by Ilana Allen DO, 06/10/24, 2:53 PM EDT.

## 2024-06-10 NOTE — PROGRESS NOTES
CODE BLUE    TIME: 2:17    Initial Rhythm: Polymorphic Vtach (Telemetry confirmed)    CODE BLUE was called at 217 after nursing staff found patient blue and unresponsive.  CPR was started.  By the time I arrived, patient actually achieved ROSC briefly.  At this time I was unsure of what the initial rhythm was.  Shortly after, the patient was noted to be in Vtach and received 1 defibrillation.  CPR was continued.  At that time I attempted intubation however, I noticed that patient was resisting me with his tongue and had spontaneous breaths on his own.  Pulse check confirmed that patient did achieve ROSC again.  At that time patient began to try to get up however he was not redirectable or following commands.  At that time I decided to sedate him and intubate him for situation control.  Etomidate and succinylcholine was administered and patient was intubated via glide scope.      During transport to the ICU, the patient began to arouse and Versed 2 mg was given intravenously.  Patient however became more combative and erratic and was flailing around in bed and both IV accesses were lost.  Patient also at this time vomited a blue-green substance that we believe is a slushy.  Attempted to redirect patient to calm down and allow us to re-access peripheral IVs however he would not comply.  IM versed was administered and an IO was applied to left tibia.  Propofol was used to achieve sedation.  Peripheral IVs obtained.    Initial EKG after code showed ST depressions in 2 3 aVF, ST elevation in V1 and aVR, ST depressions in V4 V5 V6.  I however did not see this until after we were able to control the situation and sedate the patient in the ICU.  At this time I ordered another repeat EKG that showed ST depressions again and 2 3 aVF, V2 through V6  It was at this time that I was able to go back into the telemetry strips and see the time of the code and noted that he went from normal sinus rhythm into V-tach    Cardiology   Magi called and case was discussed.  Patient will be going for emergent cath.      Critical care time: > 75 minutes

## 2024-06-10 NOTE — CONSULTS
06/10/24 1336   Coping/Psychosocial   Additional Documentation Spiritual Care (Group)   Spiritual Care   Spiritual Care Source nurse referral   Spiritual Care Follow-Up other (see comments)  (pt is waiting for a bed at a higher level of care)   Spiritual Care Visit Type initial   Spiritual Care Request family support   Receptivity to Spiritual Care other (see comments)  (pt is currently sedated and on vent support. introductions made to his family at bedside.)

## 2024-06-10 NOTE — PROGRESS NOTES
RT EQUIPMENT DEVICE RELATED - SKIN ASSESSMENT    RT Medical Equipment/Device:     ETT [employer]/Anchorfast    Skin Assessment:      Cheek:  Intact  Neck:  Intact  Lips:  Intact    Device Skin Pressure Protection:  Skin-to-device areas padded:  Anchorfast    Nurse Notification:  Henna Mcintyre, RRT

## 2024-06-10 NOTE — PROGRESS NOTES
Interventional cardiology progress note    Mr. Farias underwent angioplasty with stent placement to SVG to diagonal branch this morning, following presentation with non-STEMI and cardiac arrest secondary to polymorphic ventricular tachycardia in-house.  He was transferred back to intensive care unit on amiodarone infusion and Levophed infusion.  He remains intubated.  He is currently hemodynamically stable.  Right femoral artery sheath is in place.    His echocardiogram done today reviewed.  Ejection fraction is close to 20% with a dilated left ventricle.  There is some concern about aneurysm involving LV apex.  His LVEDP was between 25 and 30 mmHg per Cardiac catheterization done earlier today.    I discussed the clinical scenario, echocardiogram and angiogram findings with Dr. Justin Oleary, advanced heart failure specialist at [facility].  Patient will be transferred to [facility] for further care at a tertiary center, for options including left ventricular support devices.    I talked to patient's spouse over the phone and discussed her management plans.  They are in agreement for transfer to higher center.    Patient will follow-up with Dr. Gonzalez, primary cardiologist on discharge from the hospital.

## 2024-06-11 LAB
QT INTERVAL: 409 MS
QT INTERVAL: 417 MS
QTC INTERVAL: 403 MS
QTC INTERVAL: 442 MS

## 2024-06-12 LAB
BACTERIA SPEC RESP CULT: NORMAL
GRAM STN SPEC: NORMAL
GRAM STN SPEC: NORMAL

## 2024-06-15 LAB
QT INTERVAL: 280 MS
QT INTERVAL: 361 MS
QT INTERVAL: 385 MS
QTC INTERVAL: 391 MS
QTC INTERVAL: 461 MS
QTC INTERVAL: 503 MS

## 2024-06-27 ENCOUNTER — OFFICE VISIT (OUTPATIENT)
Dept: CARDIOLOGY | Facility: CLINIC | Age: 59
End: 2024-06-27
Payer: COMMERCIAL

## 2024-06-27 ENCOUNTER — TELEPHONE (OUTPATIENT)
Dept: CARDIOLOGY | Facility: CLINIC | Age: 59
End: 2024-06-27
Payer: COMMERCIAL

## 2024-06-27 VITALS
SYSTOLIC BLOOD PRESSURE: 121 MMHG | WEIGHT: 226 LBS | HEART RATE: 74 BPM | DIASTOLIC BLOOD PRESSURE: 77 MMHG | HEIGHT: 71 IN | BODY MASS INDEX: 31.64 KG/M2

## 2024-06-27 DIAGNOSIS — E78.2 MIXED HYPERLIPIDEMIA: ICD-10-CM

## 2024-06-27 DIAGNOSIS — I21.4 NSTEMI (NON-ST ELEVATED MYOCARDIAL INFARCTION): Primary | ICD-10-CM

## 2024-06-27 DIAGNOSIS — I25.5 ISCHEMIC CARDIOMYOPATHY: ICD-10-CM

## 2024-06-27 DIAGNOSIS — Z98.61 CAD S/P PERCUTANEOUS CORONARY ANGIOPLASTY: Primary | ICD-10-CM

## 2024-06-27 DIAGNOSIS — I25.10 CAD S/P PERCUTANEOUS CORONARY ANGIOPLASTY: Primary | ICD-10-CM

## 2024-06-27 DIAGNOSIS — I48.0 AF (PAROXYSMAL ATRIAL FIBRILLATION): ICD-10-CM

## 2024-06-27 DIAGNOSIS — I47.20 VENTRICULAR TACHYCARDIA: ICD-10-CM

## 2024-06-27 PROCEDURE — 99214 OFFICE O/P EST MOD 30 MIN: CPT | Performed by: FAMILY MEDICINE

## 2024-06-27 RX ORDER — ATORVASTATIN CALCIUM 80 MG/1
80 TABLET, FILM COATED ORAL NIGHTLY
Qty: 90 TABLET | Refills: 3
Start: 2024-06-27

## 2024-06-27 RX ORDER — METOPROLOL SUCCINATE 50 MG/1
1 TABLET, EXTENDED RELEASE ORAL DAILY
COMMUNITY
Start: 2024-06-19 | End: 2024-06-27 | Stop reason: SDUPTHER

## 2024-06-27 RX ORDER — DAPAGLIFLOZIN 10 MG/1
1 TABLET, FILM COATED ORAL DAILY
COMMUNITY
Start: 2024-06-20

## 2024-06-27 RX ORDER — AMIODARONE HYDROCHLORIDE 200 MG/1
2 TABLET ORAL DAILY
COMMUNITY
Start: 2024-06-19 | End: 2024-06-27 | Stop reason: SDUPTHER

## 2024-06-27 RX ORDER — LOSARTAN POTASSIUM 25 MG/1
25 TABLET ORAL DAILY
Qty: 90 TABLET | Refills: 3 | Status: SHIPPED | OUTPATIENT
Start: 2024-06-27

## 2024-06-27 RX ORDER — AMIODARONE HYDROCHLORIDE 100 MG/1
100 TABLET ORAL DAILY
Qty: 90 TABLET | Refills: 1 | Status: SHIPPED | OUTPATIENT
Start: 2024-06-27

## 2024-06-27 RX ORDER — METOPROLOL SUCCINATE 50 MG/1
50 TABLET, EXTENDED RELEASE ORAL DAILY
Qty: 90 TABLET | Refills: 3 | Status: SHIPPED | OUTPATIENT
Start: 2024-06-27

## 2024-06-27 RX ORDER — AMLODIPINE BESYLATE 5 MG/1
5 TABLET ORAL DAILY
COMMUNITY
End: 2024-06-27

## 2024-06-27 RX ORDER — APIXABAN 5 MG/1
5 TABLET, FILM COATED ORAL EVERY 12 HOURS SCHEDULED
Qty: 180 TABLET | Refills: 1 | Status: SHIPPED | OUTPATIENT
Start: 2024-06-27

## 2024-06-27 RX ORDER — APIXABAN 5 MG/1
1 TABLET, FILM COATED ORAL EVERY 12 HOURS SCHEDULED
COMMUNITY
Start: 2024-06-19 | End: 2024-06-27 | Stop reason: SDUPTHER

## 2024-06-27 NOTE — PROGRESS NOTES
Chief Complaint  Follow-up and f/u heart cath    Subjective        History of Present Illness  Luis Farias presents to [facility] CARDIOLOGY   Mr. Farias is a 59-year-old male patient coming in today for cardiac follow-up.  He has a past medical history of CAD with previous CABG in 2014.  He initially presented to the ER on June 9 at [facility] due to onset of chest pain while working.  He had mild ST segment depression, and elevated troponins.  Overnight he developed significant chest pressure, followed by an episode of vomiting before he lost consciousness.  He had polymorphic ventricular tachycardia.  He had brief CPR and was intubated before having ROSC.  He underwent emergent cardiac catheterization, with successful mechanical aspiration thrombectomy of SVG to the diagonal branch, and PCI.  This was the culprit lesion for his presentation with ACS and ventricular tachycardia arrest.  He had patent LIMA graft to the LAD and SVG to the right PDA branch, but he had 3 tight lesions in his left circumflex.  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  He was transferred to [facility].  He was treated for cardiogenic shock, responded well to diuretics.  In addition he had sepsis/pneumonia, and he underwent bronchoscopy.  During the course of his hospitalization he also had episode of atrial fibrillation.  Once he was stabilized he did undergo cardiac catheterization on 6/18 with 2 drug-eluting stents placed to the circumflex system.  Subsequent echocardiogram before discharge showed EF slightly improved at 30 to 35%.   He done a week of triple therapy, was discharged home with Brilinta and Eliquis.  He is tolerating both medications without bleeding issues.  Overall is doing well, has no complaints of chest pains, palpitations, or lightheadedness or dizziness.  He has some  "shortness of breath with activity but fairly mild in nature.  He is not experiencing any lower extremity swelling.  He was initially discharged home on lisinopril, but was unable to tolerate this due to cough.    Given    Past Medical History:   Diagnosis Date    CHF (congestive heart failure)     Coronary artery disease     Hyperlipidemia     Hypertension        Allergies   Allergen Reactions    Lisinopril Cough        Past Surgical History:   Procedure Laterality Date    CARDIAC CATHETERIZATION N/A 6/10/2024    Procedure: Left Heart Cath;  Surgeon: Raimundo Sow MD;  Location: [facility] INVASIVE LOCATION;  Service: Cardiovascular;  Laterality: N/A;    CARDIAC CATHETERIZATION N/A 6/10/2024    Procedure: Percutaneous Coronary Intervention;  Surgeon: Raimunod Sow MD;  Location: [facility] INVASIVE LOCATION;  Service: Cardiovascular;  Laterality: N/A;        Social History  He  reports that he has never smoked. He has never used smokeless tobacco. He reports that he does not drink alcohol and does not use drugs.    Family History  His family history is not on file.       Current Outpatient Medications on File Prior to Visit   Medication Sig    Farxiga 10 MG tablet Take 10 mg by mouth Daily.    famotidine (PEPCID) 40 MG tablet Take 1 tablet by mouth Daily. (Patient not taking: Reported on 6/27/2024)     No current facility-administered medications on file prior to visit.         Review of Systems   Constitutional:  Positive for fatigue.   Respiratory:  Positive for shortness of breath. Negative for cough and chest tightness.    Cardiovascular:  Negative for chest pain, palpitations and leg swelling.   Gastrointestinal:  Negative for nausea and vomiting.   Neurological:  Negative for dizziness and syncope.        Objective   Vitals:    06/27/24 1040   BP: 121/77   Pulse: 74   Weight: 103 kg (226 lb)   Height: 180.3 cm (70.98\")         Physical Exam  General : Alert, awake, no acute distress  Neck : Supple, " "no carotid bruit, no jugular venous distention  CVS : Regular rate and rhythm, no murmur, no rubs or gallops  Lungs: Clear to auscultation bilaterally, no crackles or rhonchi  Abdomen: Soft, nontender, bowel sounds active  Extremities: Warm, well-perfused, no pedal edema      Result Review     The following data was reviewed by KARLO James  proBNP   Date Value Ref Range Status   06/09/2024 191.2 0.0 - 900.0 pg/mL Final     CMP          6/9/2024    14:48 6/10/2024    01:12 6/10/2024    03:16   CMP   Glucose 120  149  262    BUN 17  18  20    Creatinine 1.38  1.03  1.30    EGFR 59.6  84.7  64.1    Sodium 142  138  140    Potassium 4.1  3.8  4.1    Chloride 103  104  101    Calcium 9.5  8.4  8.4    Total Protein 6.5   6.3    Albumin 4.4   4.0    Globulin 2.1   2.3    Total Bilirubin 0.8   1.0    Alkaline Phosphatase 67   67    AST (SGOT) 41   249    ALT (SGPT) 41   97    Albumin/Globulin Ratio 2.1   1.7    BUN/Creatinine Ratio 12.3  17.5  15.4    Anion Gap 15.4  10.8  18.9      CBC w/diff          6/9/2024    14:48 6/10/2024    01:12 6/10/2024    03:16   CBC w/Diff   WBC 14.03  14.34  25.16    RBC 5.26  5.09  5.55    Hemoglobin 15.3  15.0  16.3    Hematocrit 43.8  43.1  48.7    MCV 83.3  84.7  87.7    MCH 29.1  29.5  29.4    MCHC 34.9  34.8  33.5    RDW 12.5  13.0  13.1    Platelets 223  209  302    Neutrophil Rel % 79.3  79.4  62.9    Immature Granulocyte Rel % 0.4  0.3  0.9    Lymphocyte Rel % 12.9  13.5  26.1    Monocyte Rel % 6.6  6.5  9.6    Eosinophil Rel % 0.4  0.1  0.1    Basophil Rel % 0.4  0.2  0.4       Lab Results   Component Value Date    TSH 1.030 06/09/2024      No results found for: \"FREET4\"   No results found for: \"DDIMERQUANT\"  Magnesium   Date Value Ref Range Status   06/10/2024 1.7 1.6 - 2.6 mg/dL Final      No results found for: \"DIGOXIN\"   Lab Results   Component Value Date    TROPONINT 2,313 (C) 06/10/2024           Lipid Panel          6/10/2024    01:12   Lipid Panel   Total " Cholesterol 168    Triglycerides 116    HDL Cholesterol 41    VLDL Cholesterol 21    LDL Cholesterol  106    LDL/HDL Ratio 2.53        Results for orders placed during the hospital encounter of 06/09/24    Adult Transthoracic Echo Complete w/ Color, Spectral and Contrast if necessary per protocol    Interpretation Summary    Left ventricular systolic function is severely decreased. Calculated left ventricular EF = 21.3%    Left ventricular diastolic function is consistent with (grade I) impaired relaxation.    There is mild calcification of the aortic valve.    Echocardiogram at [facility] 6/16/2024    Cardiac catheterization  6/10/2024  Conclusions:  100% occlusion of saphenous venous graft to diagonal branch which is the culprit lesion for patient's presentation with acute coronary syndrome and ventricular tachycardia cardiac arrest  Severe native three-vessel coronary artery disease with 100% occlusion of the native LAD and multiple tandem lesions of 90% severity in native RCA.  Native left circumflex artery has 3 tight lesions of 70 to 90% severity.  Patent LIMA graft to LAD and SVG to right PDA branch  Successful mechanical aspiration thrombectomy of SVG to diagonal branch using Phanfarera CAT Rx catheter  Successful percutaneous coronary intervention to SVG to diagonal branch into the native diagonal branch using 2.75 x 23 Xience Skypoint drug-eluting stent, postdilated using 3.5 noncompliant balloon with good angiographic results.      Cardiac catheterization 6/18/2024 at [facility]  06/18/2024 at :  S/P  IVUS assisted PCI of LCX with 3.0 x 48 mm TIFFANY, post dilated  with 3.5 mm NC balloon  IVUS assisted PCI of LCX to ostial LM with 3.5 x 48 mm TIFFANY,  post dilated with 4.0 and 4.5 mm NC balloon       Assessment and Plan   Diagnoses and all orders for this visit:    1. CAD S/P percutaneous coronary angioplasty (Primary)  Assessment & Plan:  Currently stable without any symptoms of angina.  Will get him  enrolled in cardiac rehab.  He will need to continue antiplatelet therapy with Brilinta, he is no longer on aspirin to avoid increased bleeding risk as he is currently on Eliquis.  Continue beta-blocker and statin therapy as well.    Orders:  -     CBC (No Diff); Future  -     Basic Metabolic Panel; Future    2. Ischemic cardiomyopathy  Assessment & Plan:  Following his MRI his initial echocardiogram showed severely reduced EF of 20%.  After revascularization subsequent echocardiogram at [facility] showed improvement with a EF of 30 to 35%.  Clinically does not appear to have any volume overload.  Since he was unable to tolerate lisinopril, will switch him over to losartan, he should continue metoprolol and Farxiga.  Discussed with him monitoring daily weights, low-sodium diet and notifying us for symptoms of weight gain, swelling, shortness of breath that is worsening.  Will plan to reevaluate his LV function in approximately 3 months with repeat echocardiogram.    Orders:  -     Adult Transthoracic Echo Complete W/ Cont if Necessary Per Protocol; Future    3. Ventricular tachycardia  Assessment & Plan:  He had an episode of polymorphic ventricular tachycardia following his MI which resulted in brief cardiac arrest.  Instructed him to complete a full month of 200 mg of amiodarone, since his EF is now above 30% I think is reasonable to try to titrate this dose down to avoid long-term effects.  After July 10 he will decrease dose of amiodarone to 100 mg daily.      4. Mixed hyperlipidemia  Assessment & Plan:  LDL above goal at 106, his statin therapy was intensified during hospitalization.  Continue atorvastatin 80 mg, will recheck fasting lipid profile before next follow-up visit.      5. AF (paroxysmal atrial fibrillation)  Assessment & Plan:  He had episode of atrial fibrillation during his recent hospitalization following, and cardiogenic shock.  He is in normal rhythm today without any complaints  palpitations.  We will continue with appropriate rhythm control.  He was also started on amiodarone due to episode of polymorphic ventricular tachycardia.  Continue anticoagulation with Eliquis.      Other orders  -     losartan (Cozaar) 25 MG tablet; Take 1 tablet by mouth Daily.  Dispense: 90 tablet; Refill: 3  -     ticagrelor (BRILINTA) 90 MG tablet tablet; 1 tablet by Nasogastric route 2 (Two) Times a Day.  Dispense: 180 tablet; Refill: 3  -     metoprolol succinate XL (TOPROL-XL) 50 MG 24 hr tablet; Take 1 tablet by mouth Daily.  Dispense: 90 tablet; Refill: 3  -     Eliquis 5 MG tablet tablet; Take 1 tablet by mouth Every 12 (Twelve) Hours.  Dispense: 180 tablet; Refill: 1  -     amiodarone (PACERONE) 100 MG tablet; Take 1 tablet by mouth Daily.  Dispense: 90 tablet; Refill: 1  -     atorvastatin (LIPITOR) 80 MG tablet; Take 1 tablet by mouth Every Night.  Dispense: 90 tablet; Refill: 3                Follow Up   Return in about 3 months (around 9/27/2024) for with Dr. Sow (may use new pt appt or double book), with Dr. Sow.    Patient was given instructions and counseling regarding his condition or for health maintenance advice. Please see specific information pulled into the AVS if appropriate.     Signed,  Dalia Lin, KARLO  06/27/2024     Dictated Utilizing Dragon Dictation: Please note that portions of this note were completed with a voice recognition program.  Part of this note may be an electronic transcription/translation of spoken language to printed text using the Dragon Dictation System.

## 2024-06-27 NOTE — LETTER
June 30, 2024     KARLO Ramires  [address] [address]    Patient: Luis Farias   YOB: 1965   Date of Visit: 6/27/2024       Dear KARLO Ramires    Luis Farias was in my office today. Below is a copy of my note.    If you have questions, please do not hesitate to call me. I look forward to following Luis along with you.         Sincerely,        KARLO James        CC: No Recipients    Chief Complaint  Follow-up and f/u heart cath    Subjective       History of Present Illness  Luis Farias presents to [facility] CARDIOLOGY   Mr. Farias is a 59-year-old male patient coming in today for cardiac follow-up.  He has a past medical history of CAD with previous CABG in 2014.  He initially presented to the ER on June 9 at [facility] due to onset of chest pain while working.  He had mild ST segment depression, and elevated troponins.  Overnight he developed significant chest pressure, followed by an episode of vomiting before he lost consciousness.  He had polymorphic ventricular tachycardia.  He had brief CPR and was intubated before having ROSC.  He underwent emergent cardiac catheterization, with successful mechanical aspiration thrombectomy of SVG to the diagonal branch, and PCI.  This was the culprit lesion for his presentation with ACS and ventricular tachycardia arrest.  He had patent LIMA graft to the LAD and SVG to the right PDA branch, but he had 3 tight lesions in his left circumflex.  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  He was transferred to [facility].  He was treated for cardiogenic shock, responded well to diuretics.  In addition he had sepsis/pneumonia, and he underwent bronchoscopy.  During the course of his hospitalization he also had episode of atrial fibrillation.  Once he was stabilized he did undergo  cardiac catheterization on 6/18 with 2 drug-eluting stents placed to the circumflex system.  Subsequent echocardiogram before discharge showed EF slightly improved at 30 to 35%.   He done a week of triple therapy, was discharged home with Brilinta and Eliquis.  He is tolerating both medications without bleeding issues.  Overall is doing well, has no complaints of chest pains, palpitations, or lightheadedness or dizziness.  He has some shortness of breath with activity but fairly mild in nature.  He is not experiencing any lower extremity swelling.  He was initially discharged home on lisinopril, but was unable to tolerate this due to cough.    Given    Past Medical History:   Diagnosis Date   • CHF (congestive heart failure)    • Coronary artery disease    • Hyperlipidemia    • Hypertension        Allergies   Allergen Reactions   • Lisinopril Cough        Past Surgical History:   Procedure Laterality Date   • CARDIAC CATHETERIZATION N/A 6/10/2024    Procedure: Left Heart Cath;  Surgeon: Raimundo Sow MD;  Location: [facility] INVASIVE LOCATION;  Service: Cardiovascular;  Laterality: N/A;   • CARDIAC CATHETERIZATION N/A 6/10/2024    Procedure: Percutaneous Coronary Intervention;  Surgeon: Raimundo Sow MD;  Location: [facility] INVASIVE LOCATION;  Service: Cardiovascular;  Laterality: N/A;        Social History  He  reports that he has never smoked. He has never used smokeless tobacco. He reports that he does not drink alcohol and does not use drugs.    Family History  His family history is not on file.       Current Outpatient Medications on File Prior to Visit   Medication Sig   • Farxiga 10 MG tablet Take 10 mg by mouth Daily.   • famotidine (PEPCID) 40 MG tablet Take 1 tablet by mouth Daily. (Patient not taking: Reported on 6/27/2024)     No current facility-administered medications on file prior to visit.         Review of Systems   Constitutional:  Positive for fatigue.   Respiratory:  Positive for  "shortness of breath. Negative for cough and chest tightness.    Cardiovascular:  Negative for chest pain, palpitations and leg swelling.   Gastrointestinal:  Negative for nausea and vomiting.   Neurological:  Negative for dizziness and syncope.        Objective  Vitals:    06/27/24 1040   BP: 121/77   Pulse: 74   Weight: 103 kg (226 lb)   Height: 180.3 cm (70.98\")         Physical Exam  General : Alert, awake, no acute distress  Neck : Supple, no carotid bruit, no jugular venous distention  CVS : Regular rate and rhythm, no murmur, no rubs or gallops  Lungs: Clear to auscultation bilaterally, no crackles or rhonchi  Abdomen: Soft, nontender, bowel sounds active  Extremities: Warm, well-perfused, no pedal edema      Result Review    The following data was reviewed by KARLO James  proBNP   Date Value Ref Range Status   06/09/2024 191.2 0.0 - 900.0 pg/mL Final     CMP          6/9/2024    14:48 6/10/2024    01:12 6/10/2024    03:16   CMP   Glucose 120  149  262    BUN 17  18  20    Creatinine 1.38  1.03  1.30    EGFR 59.6  84.7  64.1    Sodium 142  138  140    Potassium 4.1  3.8  4.1    Chloride 103  104  101    Calcium 9.5  8.4  8.4    Total Protein 6.5   6.3    Albumin 4.4   4.0    Globulin 2.1   2.3    Total Bilirubin 0.8   1.0    Alkaline Phosphatase 67   67    AST (SGOT) 41   249    ALT (SGPT) 41   97    Albumin/Globulin Ratio 2.1   1.7    BUN/Creatinine Ratio 12.3  17.5  15.4    Anion Gap 15.4  10.8  18.9      CBC w/diff          6/9/2024    14:48 6/10/2024    01:12 6/10/2024    03:16   CBC w/Diff   WBC 14.03  14.34  25.16    RBC 5.26  5.09  5.55    Hemoglobin 15.3  15.0  16.3    Hematocrit 43.8  43.1  48.7    MCV 83.3  84.7  87.7    MCH 29.1  29.5  29.4    MCHC 34.9  34.8  33.5    RDW 12.5  13.0  13.1    Platelets 223  209  302    Neutrophil Rel % 79.3  79.4  62.9    Immature Granulocyte Rel % 0.4  0.3  0.9    Lymphocyte Rel % 12.9  13.5  26.1    Monocyte Rel % 6.6  6.5  9.6    Eosinophil Rel % 0.4  0.1 " " 0.1    Basophil Rel % 0.4  0.2  0.4       Lab Results   Component Value Date    TSH 1.030 06/09/2024      No results found for: \"FREET4\"   No results found for: \"DDIMERQUANT\"  Magnesium   Date Value Ref Range Status   06/10/2024 1.7 1.6 - 2.6 mg/dL Final      No results found for: \"DIGOXIN\"   Lab Results   Component Value Date    TROPONINT 2,313 (C) 06/10/2024           Lipid Panel          6/10/2024    01:12   Lipid Panel   Total Cholesterol 168    Triglycerides 116    HDL Cholesterol 41    VLDL Cholesterol 21    LDL Cholesterol  106    LDL/HDL Ratio 2.53        Results for orders placed during the hospital encounter of 06/09/24    Adult Transthoracic Echo Complete w/ Color, Spectral and Contrast if necessary per protocol    Interpretation Summary  •  Left ventricular systolic function is severely decreased. Calculated left ventricular EF = 21.3%  •  Left ventricular diastolic function is consistent with (grade I) impaired relaxation.  •  There is mild calcification of the aortic valve.    Echocardiogram at [facility] 6/16/2024    Cardiac catheterization  6/10/2024  Conclusions:  100% occlusion of saphenous venous graft to diagonal branch which is the culprit lesion for patient's presentation with acute coronary syndrome and ventricular tachycardia cardiac arrest  Severe native three-vessel coronary artery disease with 100% occlusion of the native LAD and multiple tandem lesions of 90% severity in native RCA.  Native left circumflex artery has 3 tight lesions of 70 to 90% severity.  Patent LIMA graft to LAD and SVG to right PDA branch  Successful mechanical aspiration thrombectomy of SVG to diagonal branch using penumbra CAT Rx catheter  Successful percutaneous coronary intervention to SVG to diagonal branch into the native diagonal branch using 2.75 x 23 Xience Skypoint drug-eluting stent, postdilated using 3.5 noncompliant balloon with good angiographic results.      Cardiac catheterization 6/18/2024 at " [facility]  06/18/2024 at :  S/P  IVUS assisted PCI of LCX with 3.0 x 48 mm TIFFANY, post dilated  with 3.5 mm NC balloon  IVUS assisted PCI of LCX to ostial LM with 3.5 x 48 mm TIFFANY,  post dilated with 4.0 and 4.5 mm NC balloon       Assessment and Plan   Diagnoses and all orders for this visit:    1. CAD S/P percutaneous coronary angioplasty (Primary)  Assessment & Plan:  Currently stable without any symptoms of angina.  Will get him enrolled in cardiac rehab.  He will need to continue antiplatelet therapy with Brilinta, he is no longer on aspirin to avoid increased bleeding risk as he is currently on Eliquis.  Continue beta-blocker and statin therapy as well.    Orders:  -     CBC (No Diff); Future  -     Basic Metabolic Panel; Future    2. Ischemic cardiomyopathy  Assessment & Plan:  Following his MRI his initial echocardiogram showed severely reduced EF of 20%.  After revascularization subsequent echocardiogram at [facility] showed improvement with a EF of 30 to 35%.  Clinically does not appear to have any volume overload.  Since he was unable to tolerate lisinopril, will switch him over to losartan, he should continue metoprolol and Farxiga.  Discussed with him monitoring daily weights, low-sodium diet and notifying us for symptoms of weight gain, swelling, shortness of breath that is worsening.  Will plan to reevaluate his LV function in approximately 3 months with repeat echocardiogram.    Orders:  -     Adult Transthoracic Echo Complete W/ Cont if Necessary Per Protocol; Future    3. Ventricular tachycardia  Assessment & Plan:  He had an episode of polymorphic ventricular tachycardia following his MI which resulted in brief cardiac arrest.  Instructed him to complete a full month of 200 mg of amiodarone, since his EF is now above 30% I think is reasonable to try to titrate this dose down to avoid long-term effects.  After July 10 he will decrease dose of amiodarone to 100 mg daily.      4. Mixed  hyperlipidemia  Assessment & Plan:  LDL above goal at 106, his statin therapy was intensified during hospitalization.  Continue atorvastatin 80 mg, will recheck fasting lipid profile before next follow-up visit.      5. AF (paroxysmal atrial fibrillation)  Assessment & Plan:  He had episode of atrial fibrillation during his recent hospitalization following, and cardiogenic shock.  He is in normal rhythm today without any complaints palpitations.  We will continue with appropriate rhythm control.  He was also started on amiodarone due to episode of polymorphic ventricular tachycardia.  Continue anticoagulation with Eliquis.      Other orders  -     losartan (Cozaar) 25 MG tablet; Take 1 tablet by mouth Daily.  Dispense: 90 tablet; Refill: 3  -     ticagrelor (BRILINTA) 90 MG tablet tablet; 1 tablet by Nasogastric route 2 (Two) Times a Day.  Dispense: 180 tablet; Refill: 3  -     metoprolol succinate XL (TOPROL-XL) 50 MG 24 hr tablet; Take 1 tablet by mouth Daily.  Dispense: 90 tablet; Refill: 3  -     Eliquis 5 MG tablet tablet; Take 1 tablet by mouth Every 12 (Twelve) Hours.  Dispense: 180 tablet; Refill: 1  -     amiodarone (PACERONE) 100 MG tablet; Take 1 tablet by mouth Daily.  Dispense: 90 tablet; Refill: 1  -     atorvastatin (LIPITOR) 80 MG tablet; Take 1 tablet by mouth Every Night.  Dispense: 90 tablet; Refill: 3                Follow Up   Return in about 3 months (around 9/27/2024) for with Dr. Sow (may use new pt appt or double book), with Dr. Sow.    Patient was given instructions and counseling regarding his condition or for health maintenance advice. Please see specific information pulled into the AVS if appropriate.     Signed,  Dalia Lin, APRN  06/27/2024     Dictated Utilizing Dragon Dictation: Please note that portions of this note were completed with a voice recognition program.  Part of this note may be an electronic transcription/translation of spoken language to printed text using  the Dragon Dictation System.

## 2024-06-30 PROBLEM — E78.2 MIXED HYPERLIPIDEMIA: Status: ACTIVE | Noted: 2024-06-30

## 2024-06-30 PROBLEM — I48.0 AF (PAROXYSMAL ATRIAL FIBRILLATION): Status: ACTIVE | Noted: 2024-06-30

## 2024-06-30 PROBLEM — I47.20 VENTRICULAR TACHYCARDIA: Status: ACTIVE | Noted: 2024-06-30

## 2024-06-30 PROBLEM — I25.10 CAD S/P PERCUTANEOUS CORONARY ANGIOPLASTY: Status: ACTIVE | Noted: 2024-06-30

## 2024-06-30 PROBLEM — I25.5 ISCHEMIC CARDIOMYOPATHY: Status: ACTIVE | Noted: 2024-06-30

## 2024-06-30 PROBLEM — Z98.61 CAD S/P PERCUTANEOUS CORONARY ANGIOPLASTY: Status: ACTIVE | Noted: 2024-06-30

## 2024-06-30 NOTE — ASSESSMENT & PLAN NOTE
He had episode of atrial fibrillation during his recent hospitalization following, and cardiogenic shock.  He is in normal rhythm today without any complaints palpitations.  We will continue with appropriate rhythm control.  He was also started on amiodarone due to episode of polymorphic ventricular tachycardia.  Continue anticoagulation with Eliquis.

## 2024-06-30 NOTE — ASSESSMENT & PLAN NOTE
LDL above goal at 106, his statin therapy was intensified during hospitalization.  Continue atorvastatin 80 mg, will recheck fasting lipid profile before next follow-up visit.

## 2024-06-30 NOTE — ASSESSMENT & PLAN NOTE
Following his MRI his initial echocardiogram showed severely reduced EF of 20%.  After revascularization subsequent echocardiogram at [facility] showed improvement with a EF of 30 to 35%.  Clinically does not appear to have any volume overload.  Since he was unable to tolerate lisinopril, will switch him over to losartan, he should continue metoprolol and Farxiga.  Discussed with him monitoring daily weights, low-sodium diet and notifying us for symptoms of weight gain, swelling, shortness of breath that is worsening.  Will plan to reevaluate his LV function in approximately 3 months with repeat echocardiogram.

## 2024-06-30 NOTE — ASSESSMENT & PLAN NOTE
Currently stable without any symptoms of angina.  Will get him enrolled in cardiac rehab.  He will need to continue antiplatelet therapy with Brilinta, he is no longer on aspirin to avoid increased bleeding risk as he is currently on Eliquis.  Continue beta-blocker and statin therapy as well.

## 2024-06-30 NOTE — ASSESSMENT & PLAN NOTE
He had an episode of polymorphic ventricular tachycardia following his MI which resulted in brief cardiac arrest.  Instructed him to complete a full month of 200 mg of amiodarone, since his EF is now above 30% I think is reasonable to try to titrate this dose down to avoid long-term effects.  After July 10 he will decrease dose of amiodarone to 100 mg daily.

## 2024-07-09 ENCOUNTER — TELEPHONE (OUTPATIENT)
Dept: CARDIOLOGY | Facility: CLINIC | Age: 59
End: 2024-07-09
Payer: COMMERCIAL

## 2024-07-09 NOTE — TELEPHONE ENCOUNTER
Caller: Luis Farias    Relationship: Self    Best call back number: [phone] (home)      What is the best time to reach you: ANYTIME     Who are you requesting to speak with (clinical staff, provider,  specific staff member): CLINICAL       What was the call regarding: PATIENT WOULD LIKE A CALL TO INFORM HIM OF HIS RETURN TO WORK DATE.     Is it okay if the provider responds through [employer]: YES

## 2024-07-10 ENCOUNTER — TELEPHONE (OUTPATIENT)
Dept: CARDIOLOGY | Facility: CLINIC | Age: 59
End: 2024-07-10
Payer: COMMERCIAL

## 2024-07-10 NOTE — TELEPHONE ENCOUNTER
[facility] received a fax that requires your attention. The document has been indexed to the patient’s chart for your review.      Reason for sending: EXTERNAL MEDICAL RECORD NOTIFICATION     Documents Description: [facility] [id number]    Name of Sender: [facility]     Date Indexed: 7.10.24

## 2024-07-10 NOTE — TELEPHONE ENCOUNTER
I called and spoke to patient about date of returning to work. I told him 07/22/24 as this is 6 weeks after his procedure. Patient seemed hesitant about returning at that date. 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 Patient asked that I confirm this with Dr. Sow.

## 2024-07-11 NOTE — TELEPHONE ENCOUNTER
ATA patient went over information listed in detail. Patient verbalized wanting to switch to our office. Patient understands he will not be able to return to work prior to ECHO in 3 months. Patient asked about his paperwork. Will send to appropriate personnel

## 2024-07-11 NOTE — TELEPHONE ENCOUNTER
Mr. Farias has significant cardiomyopathy with a low ejection fraction.  He recently had a cardiac arrest and was eventually sent to the [facility] under advanced heart failure team.  Future management plans include appropriate medications, repeat echocardiogram in 3 months (September).  We would not recommend to go back to work until at least repeat echo is done.  If there is no improvement in heart function, he will need additional procedures like ICD placement.  He should continue follow-up with advanced heart failure clinic at [facility] as scheduled.    Mr. Farias is an established patient of Dr. Femi Suarez and we saw during the hospital stay because of acute coronary syndrome for emergent cardiac cath.  Is he is switching to our practice ?       Electronically signed by Raimundo Sow MD, 07/11/24, 8:26 AM EDT.

## 2024-07-15 DIAGNOSIS — I25.10 CAD S/P PERCUTANEOUS CORONARY ANGIOPLASTY: ICD-10-CM

## 2024-07-15 DIAGNOSIS — Z98.61 CAD S/P PERCUTANEOUS CORONARY ANGIOPLASTY: ICD-10-CM

## 2024-07-18 ENCOUNTER — TELEPHONE (OUTPATIENT)
Dept: CARDIOLOGY | Facility: CLINIC | Age: 59
End: 2024-07-18
Payer: COMMERCIAL

## 2024-07-18 NOTE — TELEPHONE ENCOUNTER
----- Message from Dalia Lin sent at 7/16/2024  9:58 PM EDT -----  Recent blood work show essentially normal chemistries and blood counts.  Continue current cardiac medications.

## 2024-09-20 ENCOUNTER — HOSPITAL ENCOUNTER (OUTPATIENT)
Dept: CARDIOLOGY | Facility: HOSPITAL | Age: 59
Discharge: HOME | End: 2024-09-20
Admitting: FAMILY MEDICINE
Payer: COMMERCIAL

## 2024-09-20 DIAGNOSIS — I25.5 ISCHEMIC CARDIOMYOPATHY: ICD-10-CM

## 2024-09-20 LAB
AORTIC DIMENSIONLESS INDEX: 0.76 (DI)
ASCENDING AORTA: 3 CM
BH CV ECHO MEAS - ACS: 1.8 CM
BH CV ECHO MEAS - AO MAX PG: 5.7 MMHG
BH CV ECHO MEAS - AO MEAN PG: 3 MMHG
BH CV ECHO MEAS - AO ROOT DIAM: 3 CM
BH CV ECHO MEAS - AO V2 MAX: 119 CM/SEC
BH CV ECHO MEAS - AO V2 VTI: 24.4 CM
BH CV ECHO MEAS - AVA(I,D): 2.39 CM2
BH CV ECHO MEAS - EDV(CUBED): 85.2 ML
BH CV ECHO MEAS - EDV(MOD-SP2): 97.8 ML
BH CV ECHO MEAS - EDV(MOD-SP4): 107 ML
BH CV ECHO MEAS - EF(MOD-BP): 33.7 %
BH CV ECHO MEAS - EF(MOD-SP2): 24.7 %
BH CV ECHO MEAS - EF(MOD-SP4): 31 %
BH CV ECHO MEAS - ESV(CUBED): 50.7 ML
BH CV ECHO MEAS - ESV(MOD-SP2): 73.6 ML
BH CV ECHO MEAS - ESV(MOD-SP4): 73.8 ML
BH CV ECHO MEAS - FS: 15.9 %
BH CV ECHO MEAS - IVS/LVPW: 1.2 CM
BH CV ECHO MEAS - IVSD: 1.2 CM
BH CV ECHO MEAS - LA DIMENSION: 4.8 CM
BH CV ECHO MEAS - LAT PEAK E' VEL: 10.9 CM/SEC
BH CV ECHO MEAS - LV MASS(C)D: 168.9 GRAMS
BH CV ECHO MEAS - LV MAX PG: 3.1 MMHG
BH CV ECHO MEAS - LV MEAN PG: 2 MMHG
BH CV ECHO MEAS - LV V1 MAX: 88.6 CM/SEC
BH CV ECHO MEAS - LV V1 VTI: 18.6 CM
BH CV ECHO MEAS - LVIDD: 4.4 CM
BH CV ECHO MEAS - LVIDS: 3.7 CM
BH CV ECHO MEAS - LVOT AREA: 3.1 CM2
BH CV ECHO MEAS - LVOT DIAM: 2 CM
BH CV ECHO MEAS - LVPWD: 1 CM
BH CV ECHO MEAS - MED PEAK E' VEL: 8.2 CM/SEC
BH CV ECHO MEAS - MR MAX PG: 98.4 MMHG
BH CV ECHO MEAS - MR MAX VEL: 496 CM/SEC
BH CV ECHO MEAS - MV A MAX VEL: 31.4 CM/SEC
BH CV ECHO MEAS - MV DEC SLOPE: 468 CM/SEC2
BH CV ECHO MEAS - MV DEC TIME: 0.22 SEC
BH CV ECHO MEAS - MV E MAX VEL: 88 CM/SEC
BH CV ECHO MEAS - MV E/A: 2.8
BH CV ECHO MEAS - MV MEAN PG: 1 MMHG
BH CV ECHO MEAS - MV P1/2T: 74.5 MSEC
BH CV ECHO MEAS - MV V2 VTI: 30 CM
BH CV ECHO MEAS - MVA(P1/2T): 3 CM2
BH CV ECHO MEAS - MVA(VTI): 1.95 CM2
BH CV ECHO MEAS - PA V2 MAX: 96.2 CM/SEC
BH CV ECHO MEAS - QP/QS: 0.62
BH CV ECHO MEAS - RAP SYSTOLE: 5 MMHG
BH CV ECHO MEAS - RV MAX PG: 1.29 MMHG
BH CV ECHO MEAS - RV V1 MAX: 56.7 CM/SEC
BH CV ECHO MEAS - RV V1 VTI: 11.5 CM
BH CV ECHO MEAS - RVDD: 4.1 CM
BH CV ECHO MEAS - RVOT DIAM: 2 CM
BH CV ECHO MEAS - RVSP: 46.5 MMHG
BH CV ECHO MEAS - SV(LVOT): 58.4 ML
BH CV ECHO MEAS - SV(MOD-SP2): 24.2 ML
BH CV ECHO MEAS - SV(MOD-SP4): 33.2 ML
BH CV ECHO MEAS - SV(RVOT): 36.1 ML
BH CV ECHO MEAS - TAPSE (>1.6): 1.52 CM
BH CV ECHO MEAS - TR MAX PG: 41.5 MMHG
BH CV ECHO MEAS - TR MAX VEL: 322 CM/SEC
BH CV ECHO MEASUREMENTS AVERAGE E/E' RATIO: 9.21
BH CV XLRA - TDI S': 6 CM/SEC
IVRT: 55 MS
LEFT ATRIUM VOLUME INDEX: 32.7 ML/M2

## 2024-09-20 PROCEDURE — 93306 TTE W/DOPPLER COMPLETE: CPT

## 2024-09-23 ENCOUNTER — OFFICE VISIT (OUTPATIENT)
Dept: CARDIOLOGY | Facility: CLINIC | Age: 59
End: 2024-09-23
Payer: COMMERCIAL

## 2024-09-23 VITALS
DIASTOLIC BLOOD PRESSURE: 90 MMHG | SYSTOLIC BLOOD PRESSURE: 162 MMHG | HEIGHT: 70 IN | BODY MASS INDEX: 33.79 KG/M2 | HEART RATE: 64 BPM | WEIGHT: 236 LBS

## 2024-09-23 DIAGNOSIS — I25.5 ISCHEMIC CARDIOMYOPATHY: Primary | ICD-10-CM

## 2024-09-23 DIAGNOSIS — I48.0 AF (PAROXYSMAL ATRIAL FIBRILLATION): ICD-10-CM

## 2024-09-23 DIAGNOSIS — I10 ESSENTIAL HYPERTENSION: ICD-10-CM

## 2024-09-23 DIAGNOSIS — I47.20 VENTRICULAR TACHYCARDIA: ICD-10-CM

## 2024-09-23 DIAGNOSIS — I25.810 CORONARY ARTERY DISEASE INVOLVING CORONARY BYPASS GRAFT OF NATIVE HEART WITHOUT ANGINA PECTORIS: ICD-10-CM

## 2024-09-23 DIAGNOSIS — E78.2 MIXED HYPERLIPIDEMIA: ICD-10-CM

## 2024-09-23 PROBLEM — I21.4 NSTEMI (NON-ST ELEVATED MYOCARDIAL INFARCTION): Status: RESOLVED | Noted: 2024-06-09 | Resolved: 2024-09-23

## 2024-09-23 PROCEDURE — 99214 OFFICE O/P EST MOD 30 MIN: CPT | Performed by: INTERNAL MEDICINE

## 2024-09-23 RX ORDER — AMIODARONE HYDROCHLORIDE 100 MG/1
100 TABLET ORAL DAILY
Qty: 90 TABLET | Refills: 3 | Status: SHIPPED | OUTPATIENT
Start: 2024-09-23

## 2024-09-23 RX ORDER — SACUBITRIL AND VALSARTAN 24; 26 MG/1; MG/1
1 TABLET, FILM COATED ORAL 2 TIMES DAILY
Qty: 60 TABLET | Refills: 4 | Status: SHIPPED | OUTPATIENT
Start: 2024-09-23

## 2024-09-24 ENCOUNTER — TELEPHONE (OUTPATIENT)
Dept: CARDIOLOGY | Facility: CLINIC | Age: 59
End: 2024-09-24
Payer: COMMERCIAL

## 2024-09-24 DIAGNOSIS — E78.2 MIXED HYPERLIPIDEMIA: Primary | ICD-10-CM

## 2024-09-25 ENCOUNTER — TELEPHONE (OUTPATIENT)
Dept: CARDIOLOGY | Facility: CLINIC | Age: 59
End: 2024-09-25
Payer: COMMERCIAL

## 2024-11-05 ENCOUNTER — TELEPHONE (OUTPATIENT)
Dept: CARDIOLOGY | Facility: CLINIC | Age: 59
End: 2024-11-05
Payer: COMMERCIAL

## 2024-11-05 DIAGNOSIS — I10 ESSENTIAL HYPERTENSION: Primary | ICD-10-CM

## 2024-11-05 RX ORDER — SACUBITRIL AND VALSARTAN 49; 51 MG/1; MG/1
1 TABLET, FILM COATED ORAL 2 TIMES DAILY
Qty: 60 TABLET | Refills: 11 | Status: SHIPPED | OUTPATIENT
Start: 2024-11-05

## 2024-11-05 NOTE — TELEPHONE ENCOUNTER
ATA patient. Went over recommendations. Patient is agreeable. Sent new prescription and lab order placed.

## 2024-11-05 NOTE — TELEPHONE ENCOUNTER
Per Dalia Marr, APRN: Blood pressure logs reveiwed, still borderline elevated.  In a separate message, he indicated he is having reoccurring yeast infections.  Please let him know we recommend stopping the Farxiga.  Since his blood pressure still mildly elevated and we are having to stop the Farxiga,I would recommend going ahead and increasing dose of Entresto to 49/51 twice daily.  (All of the doses are not exactly this time, he may take 2 of the 24/26 twice daily until he is able to  his new prescription) if he is agreeable please send updated prescription to his pharmacy.  Recommend rechecking BMP 2 weeks after making medication adjustment.

## 2024-11-27 ENCOUNTER — TELEPHONE (OUTPATIENT)
Dept: CARDIOLOGY | Facility: CLINIC | Age: 59
End: 2024-11-27
Payer: COMMERCIAL

## 2024-11-27 RX ORDER — HYDRALAZINE HYDROCHLORIDE 25 MG/1
25 TABLET, FILM COATED ORAL 2 TIMES DAILY
Qty: 60 TABLET | Refills: 3 | Status: SHIPPED | OUTPATIENT
Start: 2024-11-27

## 2024-11-27 NOTE — TELEPHONE ENCOUNTER
ATA patient. Went over BP medications, encouraged to keep BP log for 2 weeks and send in for review.

## 2024-12-05 ENCOUNTER — OFFICE VISIT (OUTPATIENT)
Dept: CARDIOLOGY | Facility: CLINIC | Age: 59
End: 2024-12-05
Payer: COMMERCIAL

## 2024-12-05 VITALS
HEART RATE: 67 BPM | SYSTOLIC BLOOD PRESSURE: 143 MMHG | DIASTOLIC BLOOD PRESSURE: 81 MMHG | HEIGHT: 70 IN | WEIGHT: 242 LBS | BODY MASS INDEX: 34.65 KG/M2

## 2024-12-05 DIAGNOSIS — E78.2 MIXED HYPERLIPIDEMIA: ICD-10-CM

## 2024-12-05 DIAGNOSIS — I47.20 VENTRICULAR TACHYCARDIA: ICD-10-CM

## 2024-12-05 DIAGNOSIS — I25.810 CORONARY ARTERY DISEASE INVOLVING CORONARY BYPASS GRAFT OF NATIVE HEART WITHOUT ANGINA PECTORIS: Primary | ICD-10-CM

## 2024-12-05 DIAGNOSIS — I25.5 ISCHEMIC CARDIOMYOPATHY: ICD-10-CM

## 2024-12-05 DIAGNOSIS — I10 ESSENTIAL HYPERTENSION: ICD-10-CM

## 2024-12-05 DIAGNOSIS — I48.0 AF (PAROXYSMAL ATRIAL FIBRILLATION): ICD-10-CM

## 2024-12-05 PROCEDURE — 99214 OFFICE O/P EST MOD 30 MIN: CPT | Performed by: INTERNAL MEDICINE

## 2024-12-05 RX ORDER — CLOPIDOGREL BISULFATE 75 MG/1
75 TABLET ORAL DAILY
Qty: 90 TABLET | Refills: 3 | Status: SHIPPED | OUTPATIENT
Start: 2024-12-05

## 2024-12-08 NOTE — ASSESSMENT & PLAN NOTE
In the setting of acute coronary syndrome and cardiac arrest, likely driven by ischemia.  He is on 100 mg of amiodarone now since discharge in the hospital.  No recent palpitations.  Will discontinue the medication during next visit.

## 2024-12-08 NOTE — ASSESSMENT & PLAN NOTE
Blood pressure borderline elevated in the office today and also per home blood pressure log.  Increasing Entresto dose to 97/103 mg twice daily.  Recently started on hydralazine which will be continued.  Continue metoprolol as well.  Encouraged to keep home blood pressure log.  As the next step, if blood pressure remains elevated metoprolol will be changed to carvedilol.  Spironolactone can also be added as needed.

## 2024-12-08 NOTE — ASSESSMENT & PLAN NOTE
Previous  on maximal dose of atorvastatin.  He is currently not taking statins due to severe muscle pain.  Now on Repatha and already received 5 injections.  We will repeat the lipid panel later this month along with CMP.

## 2024-12-08 NOTE — ASSESSMENT & PLAN NOTE
Repeat echo again showed ejection fraction of 30 to 35%.  Since then he has been on Entresto.  He is clinically not volume overloaded, however reports significant fatigue and exertional shortness of breath.  NYHA class III symptoms.    -Increasing Entresto dose to 97/103 mg twice daily  -Continue metoprolol succinate 50 mg once daily  -Unable to tolerate Farxiga due to recurrent yeast infections of the genitalia  -Will add spironolactone as the next step after repeat echocardiogram  -Will also make referral back to advanced heart failure clinic with [facility] for ongoing follow-up due to cardiomyopathy with significant symptoms

## 2024-12-08 NOTE — ASSESSMENT & PLAN NOTE
Currently in sinus rhythm.  Continue Eliquis for anticoagulation.  Repeating CBC with next lab draw.

## 2024-12-08 NOTE — ASSESSMENT & PLAN NOTE
He is currently chest pain-free.  Completed cardiac rehab.  Because of ongoing shortness of breath and fatigue, we will discontinue Brilinta and start Plavix 75 mg daily.  Continue beta-blockers.  Unable to tolerate statins, continue Repatha.

## 2024-12-08 NOTE — PROGRESS NOTES
CARDIOLOGY FOLLOW-UP PROGRESS NOTE        Chief Complaint  Follow-up, Coronary Artery Disease, Cardiomyopathy, and Hyperlipidemia    Subjective            Luis Farias presents to [facility] CARDIOLOGY  History of Present Illness    Mr. Farias is here for a 3-month follow-up visit.  During last office visit, Entresto was initiated for cardiomyopathy.  Patient also completed cardiac rehab since last visit.    He continues to report severe fatigue, weakness and exertional shortness of breath.  No recent episodes of chest pain.  Farxiga was stopped 2 months back due to recurrent yeast infections of the genitalia.  He submitted a blood pressure log recently which showed persistently elevated blood pressure.  Hydralazine 25 mg twice daily was added to the regimen.      Past History:    Coronary artery disease : Previous coronary artery bypass grafting in 2014.  Presented with chest pain/non-STEMI on 6/10/2024.  Developed V. tach with cardiac arrest in the hospital requiring brief CPR and intubation.  Urgent cardiac cath showed 100% occlusion of SVG to diagonal branch, which was culprit lesion.  Underwent aspirational ectomy coronary angioplasty with stent placement.  Developed cardiogenic shock, transferred to [facility].  He underwent staged angioplasty of the native left circumflex artery with drug-eluting stents on 6/18/2024 at [facility].  LIMA graft to LAD and SVG to RPDA are patent.    Ischemic cardiomyopathy.  Index LV gram ejection fraction 20%, improved to 30 to 35% on discharge.     Paroxysmal atrial fibrillation : Detected following myocardial infarction, on anticoagulation with Eliquis    Mixed hyperlipidemia, intolerance to statins      Medical History:  Past Medical History:   Diagnosis Date    CHF (congestive heart failure)     Coronary artery disease     Hyperlipidemia     Hypertension     NSTEMI (non-ST elevated myocardial infarction) 06/09/2024       Social History:  "reports that he has never smoked. He has never used smokeless tobacco. He reports that he does not drink alcohol and does not use drugs.    Allergies: Atorvastatin and Lisinopril    Current Outpatient Medications on File Prior to Visit   Medication Sig    amiodarone (PACERONE) 100 MG tablet TAKE 1 TABLET BY MOUTH EVERY DAY    Eliquis 5 MG tablet tablet Take 1 tablet by mouth Every 12 (Twelve) Hours.    Evolocumab (REPATHA) solution auto-injector SureClick injection Inject 1 mL under the skin into the appropriate area as directed Every 14 (Fourteen) Days.    famotidine (PEPCID) 40 MG tablet Take 1 tablet by mouth Daily.    hydrALAZINE (APRESOLINE) 25 MG tablet Take 1 tablet by mouth 2 (Two) Times a Day.    metoprolol succinate XL (TOPROL-XL) 50 MG 24 hr tablet Take 1 tablet by mouth Daily.     No current facility-administered medications on file prior to visit.          Review of Systems   Constitutional:  Positive for fatigue.   Respiratory:  Negative for cough, shortness of breath and wheezing.    Cardiovascular:  Negative for chest pain, palpitations and leg swelling.   Gastrointestinal:  Negative for nausea and vomiting.   Neurological:  Negative for dizziness and syncope.        Objective     /81   Pulse 67   Ht 177.8 cm (70\")   Wt 110 kg (242 lb)   BMI 34.72 kg/m²       Physical Exam    General : Alert, awake, no acute distress  Neck : Supple, no carotid bruit, no jugular venous distention  CVS : Regular rate and rhythm, no murmur, rubs or gallops  Lungs: Clear to auscultation bilaterally, no crackles or rhonchi  Abdomen: Soft, nontender, bowel sounds heard in all 4 quadrants  Extremities: Warm, well-perfused, no pedal edema    Result Review :     The following data was reviewed by: Raimundo Sow MD on 12/05/2024:    CMP          6/9/2024    14:48 6/10/2024    01:12 6/10/2024    03:16   CMP   Glucose 120  149  262    BUN 17  18  20    Creatinine 1.38  1.03  1.30    EGFR 59.6  84.7  64.1    Sodium " 142  138  140    Potassium 4.1  3.8  4.1    Chloride 103  104  101    Calcium 9.5  8.4  8.4    Total Protein 6.5   6.3    Albumin 4.4   4.0    Globulin 2.1   2.3    Total Bilirubin 0.8   1.0    Alkaline Phosphatase 67   67    AST (SGOT) 41   249    ALT (SGPT) 41   97    Albumin/Globulin Ratio 2.1   1.7    BUN/Creatinine Ratio 12.3  17.5  15.4    Anion Gap 15.4  10.8  18.9      CBC          6/9/2024    14:48 6/10/2024    01:12 6/10/2024    03:16   CBC   WBC 14.03  14.34  25.16    RBC 5.26  5.09  5.55    Hemoglobin 15.3  15.0  16.3    Hematocrit 43.8  43.1  48.7    MCV 83.3  84.7  87.7    MCH 29.1  29.5  29.4    MCHC 34.9  34.8  33.5    RDW 12.5  13.0  13.1    Platelets 223  209  302      TSH          6/9/2024    17:06   TSH   TSH 1.030      Lipid Panel          6/10/2024    01:12   Lipid Panel   Total Cholesterol 168    Triglycerides 116    HDL Cholesterol 41    VLDL Cholesterol 21    LDL Cholesterol  106    LDL/HDL Ratio 2.53           Data reviewed: Cardiology studies        Results for orders placed during the hospital encounter of 09/20/24    Adult Transthoracic Echo Complete W/ Cont if Necessary Per Protocol    Interpretation Summary    Left ventricular systolic function is severely decreased with moderate to severe hypokinesis of the apex, anterior septum and distal anterior wall.. Left ventricular ejection fraction appears to be 31 - 35%.    There is diastolic dysfunction of the left ventricle.    The left atrial cavity is mildly dilated.    There is mild tricuspid regurgitation.  Estimated right ventricular systolic pressure from tricuspid regurgitation is moderately elevated (45-55 mmHg).                   Assessment and Plan        Diagnoses and all orders for this visit:    1. Coronary artery disease involving coronary bypass graft of native heart without angina pectoris (Primary)  Assessment & Plan:  He is currently chest pain-free.  Completed cardiac rehab.  Because of ongoing shortness of breath and  fatigue, we will discontinue Brilinta and start Plavix 75 mg daily.  Continue beta-blockers.  Unable to tolerate statins, continue Repatha.    Orders:  -     clopidogrel (PLAVIX) 75 MG tablet; Take 1 tablet by mouth Daily.  Dispense: 90 tablet; Refill: 3  -     CBC (No Diff); Future    2. Mixed hyperlipidemia  Assessment & Plan:  Previous  on maximal dose of atorvastatin.  He is currently not taking statins due to severe muscle pain.  Now on Repatha and already received 5 injections.  We will repeat the lipid panel later this month along with CMP.    Orders:  -     Comprehensive Metabolic Panel; Future  -     Lipid Panel; Future    3. Ischemic cardiomyopathy  Assessment & Plan:  Repeat echo again showed ejection fraction of 30 to 35%.  Since then he has been on Entresto.  He is clinically not volume overloaded, however reports significant fatigue and exertional shortness of breath.  NYHA class III symptoms.    -Increasing Entresto dose to 97/103 mg twice daily  -Continue metoprolol succinate 50 mg once daily  -Unable to tolerate Farxiga due to recurrent yeast infections of the genitalia  -Will add spironolactone as the next step after repeat echocardiogram  -Will also make referral back to advanced heart failure clinic with [facility] for ongoing follow-up due to cardiomyopathy with significant symptoms    Orders:  -     sacubitril-valsartan (ENTRESTO)  MG tablet; Take 1 tablet by mouth 2 (Two) Times a Day.  Dispense: 180 tablet; Refill: 2  -     proBNP; Future  -     Cancel: Adult Transthoracic Echo Complete W/ Cont if Necessary Per Protocol; Future  -     Adult Transthoracic Echo Complete W/ Cont if Necessary Per Protocol; Future    4. Ventricular tachycardia  Assessment & Plan:  In the setting of acute coronary syndrome and cardiac arrest, likely driven by ischemia.  He is on 100 mg of amiodarone now since discharge in the hospital.  No recent palpitations.  Will discontinue the  medication during next visit.      5. Essential hypertension  Assessment & Plan:  Blood pressure borderline elevated in the office today and also per home blood pressure log.  Increasing Entresto dose to 97/103 mg twice daily.  Recently started on hydralazine which will be continued.  Continue metoprolol as well.  Encouraged to keep home blood pressure log.  As the next step, if blood pressure remains elevated metoprolol will be changed to carvedilol.  Spironolactone can also be added as needed.      6. AF (paroxysmal atrial fibrillation)  Assessment & Plan:  Currently in sinus rhythm.  Continue Eliquis for anticoagulation.  Repeating CBC with next lab draw.                Follow Up     Return for Will schedule f/u after reviewing test results.    Patient was given instructions and counseling regarding his condition or for health maintenance advice. Please see specific information pulled into the AVS if appropriate.

## 2024-12-09 ENCOUNTER — TELEPHONE (OUTPATIENT)
Dept: CARDIOLOGY | Facility: CLINIC | Age: 59
End: 2024-12-09
Payer: COMMERCIAL

## 2024-12-09 NOTE — TELEPHONE ENCOUNTER
ATA Bahena's [place] office. Patient would be better served  in [facility] office, advanced heart failure clinic is every Thursday in [facility]. Calling Mary Bahena's MA in [facility]. [phone].

## 2024-12-10 NOTE — TELEPHONE ENCOUNTER
SW April at Dr Bahena, Advanced Heart Failure clinic. Patient has been scheduled to see Dr Bahena on 01/09/2025 at 1030, location [address], [address].     Patient is aware.     Patient states he has not heard back from scheduling his ECHO.

## 2024-12-17 ENCOUNTER — HOSPITAL ENCOUNTER (OUTPATIENT)
Dept: CARDIOLOGY | Facility: HOSPITAL | Age: 59
Discharge: HOME | End: 2024-12-17
Admitting: INTERNAL MEDICINE
Payer: COMMERCIAL

## 2024-12-17 DIAGNOSIS — I25.5 ISCHEMIC CARDIOMYOPATHY: ICD-10-CM

## 2024-12-17 LAB
AORTIC DIMENSIONLESS INDEX: 0.7 (DI)
ASCENDING AORTA: 3.2 CM
BH CV ECHO MEAS - ACS: 1.7 CM
BH CV ECHO MEAS - AO MEAN PG: 3 MMHG
BH CV ECHO MEAS - AO ROOT DIAM: 3.1 CM
BH CV ECHO MEAS - AO V2 VTI: 26.9 CM
BH CV ECHO MEAS - EDV(CUBED): 132.7 ML
BH CV ECHO MEAS - EDV(MOD-SP2): 169 ML
BH CV ECHO MEAS - EDV(MOD-SP4): 170 ML
BH CV ECHO MEAS - EF(MOD-BP): 34.3 %
BH CV ECHO MEAS - EF(MOD-SP2): 34.9 %
BH CV ECHO MEAS - EF(MOD-SP4): 33.5 %
BH CV ECHO MEAS - ESV(CUBED): 64.5 ML
BH CV ECHO MEAS - ESV(MOD-SP2): 110 ML
BH CV ECHO MEAS - ESV(MOD-SP4): 113 ML
BH CV ECHO MEAS - FS: 21.4 %
BH CV ECHO MEAS - IVS/LVPW: 1.1 CM
BH CV ECHO MEAS - IVSD: 1.23 CM
BH CV ECHO MEAS - LA DIMENSION: 5.4 CM
BH CV ECHO MEAS - LAT PEAK E' VEL: 10.4 CM/SEC
BH CV ECHO MEAS - LV DIASTOLIC VOL/BSA (35-75): 75.1 CM2
BH CV ECHO MEAS - LV MASS(C)D: 234.3 GRAMS
BH CV ECHO MEAS - LV MAX PG: 3.7 MMHG
BH CV ECHO MEAS - LV MEAN PG: 2 MMHG
BH CV ECHO MEAS - LV SYSTOLIC VOL/BSA (12-30): 49.9 CM2
BH CV ECHO MEAS - LV V1 MAX: 96 CM/SEC
BH CV ECHO MEAS - LV V1 VTI: 18.9 CM
BH CV ECHO MEAS - LVIDD: 5.1 CM
BH CV ECHO MEAS - LVIDS: 4 CM
BH CV ECHO MEAS - LVPWD: 1.12 CM
BH CV ECHO MEAS - MED PEAK E' VEL: 5.7 CM/SEC
BH CV ECHO MEAS - MV A MAX VEL: 39.9 CM/SEC
BH CV ECHO MEAS - MV DEC TIME: 0.17 SEC
BH CV ECHO MEAS - MV E MAX VEL: 68.4 CM/SEC
BH CV ECHO MEAS - MV E/A: 1.71
BH CV ECHO MEAS - PA V2 MAX: 114 CM/SEC
BH CV ECHO MEAS - PI END-D VEL: 139 CM/SEC
BH CV ECHO MEAS - RVDD: 3.3 CM
BH CV ECHO MEAS - SV(MOD-SP2): 59 ML
BH CV ECHO MEAS - SV(MOD-SP4): 57 ML
BH CV ECHO MEAS - SVI(MOD-SP2): 26.1 ML/M2
BH CV ECHO MEAS - SVI(MOD-SP4): 25.2 ML/M2
BH CV ECHO MEAS - TAPSE (>1.6): 1.22 CM
BH CV ECHO MEASUREMENTS AVERAGE E/E' RATIO: 8.5
IVRT: 116 MS
LEFT ATRIUM VOLUME INDEX: 31.2 ML/M2

## 2024-12-17 PROCEDURE — 93306 TTE W/DOPPLER COMPLETE: CPT

## 2024-12-18 ENCOUNTER — TELEPHONE (OUTPATIENT)
Dept: CARDIOLOGY | Facility: CLINIC | Age: 59
End: 2024-12-18
Payer: COMMERCIAL

## 2024-12-18 NOTE — TELEPHONE ENCOUNTER
----- Message from Raimundo Sow sent at 12/17/2024  7:14 PM EST -----  Echocardiogram showed no significant changes from previous studies.  Ejection fraction is still close to 35%, unchanged from September.  No major valvular problems noted.    Continue other current medications, he has follow-up with heart failure clinic on January 9.    Follow-up with KARLO Rios in 8 weeks from now.      Electronically signed by Raimundo Sow MD, 12/17/24, 7:13 PM EST.

## 2024-12-18 NOTE — TELEPHONE ENCOUNTER
SW patient regarding results and recommendation for f/u. Patient verbalized understanding and appreciation. F/U is scheduled.

## 2024-12-18 NOTE — PROGRESS NOTES
Echocardiogram showed no significant changes from previous studies.  Ejection fraction is still close to 35%, unchanged from September.  No major valvular problems noted.    Continue other current medications, he has follow-up with heart failure clinic on January 9.    Follow-up with KARLO Rios in 8 weeks from now.      Electronically signed by Raimundo Sow MD, 12/17/24, 7:13 PM EST.

## 2024-12-23 ENCOUNTER — TELEPHONE (OUTPATIENT)
Dept: CARDIOLOGY | Facility: CLINIC | Age: 59
End: 2024-12-23
Payer: COMMERCIAL

## 2024-12-23 NOTE — TELEPHONE ENCOUNTER
Caller: LARISA CRUZ    Relationship: Emergency Contact    Best call back number: [phone]     What form or medical record are you requesting: ACTIVE LABS    Who is requesting this form or medical record from you: SELF    How would you like to receive the form or medical records (pick-up, mail, fax): PT HAD PHONE NUMBER  BUT DOES NOT HAVE FAX. WAS WANTING TO GET LAB WORK DONE THERE  AND THEY ARE NEEDING AN ORDER  [phone]    Timeframe paperwork needed: BEFORE 26TH

## 2024-12-23 NOTE — TELEPHONE ENCOUNTER
Called pt. To address concerns and pt. And pt. Needed to fax lab work to [facility] to [phone]. I was able to fax lab orders to [facility].

## 2024-12-31 ENCOUNTER — TELEPHONE (OUTPATIENT)
Dept: CARDIOLOGY | Facility: CLINIC | Age: 59
End: 2024-12-31
Payer: COMMERCIAL

## 2024-12-31 DIAGNOSIS — I25.810 CORONARY ARTERY DISEASE INVOLVING CORONARY BYPASS GRAFT OF NATIVE HEART WITHOUT ANGINA PECTORIS: ICD-10-CM

## 2024-12-31 NOTE — TELEPHONE ENCOUNTER
[facility] received a fax that requires your attention. The document has been indexed to the patient’s chart for your review.      Reason for sending: EXTERNAL MEDICAL RECORD NOTIFICATION     Documents Description: METOPROLOL [id number]    Name of Sender: [facility]     Date Indexed: 12.31.24

## 2025-01-02 RX ORDER — METOPROLOL SUCCINATE 50 MG/1
50 TABLET, EXTENDED RELEASE ORAL DAILY
Qty: 90 TABLET | Refills: 3 | Status: SHIPPED | OUTPATIENT
Start: 2025-01-02

## 2025-01-08 ENCOUNTER — TELEPHONE (OUTPATIENT)
Dept: CARDIOLOGY | Facility: CLINIC | Age: 60
End: 2025-01-08
Payer: COMMERCIAL

## 2025-01-08 NOTE — TELEPHONE ENCOUNTER
----- Message from Dalia Lin sent at 1/3/2025  7:52 PM EST -----  Please let patient know we received copies of his labs, chemistries and blood counts are all essentially normal, cholesterol level is actually on the low side recommend decreasing Repatha injections, and instead of taking every 2 weeks to move to taking every 4 weeks.

## 2025-02-17 ENCOUNTER — OFFICE VISIT (OUTPATIENT)
Dept: CARDIOLOGY | Facility: CLINIC | Age: 60
End: 2025-02-17
Payer: COMMERCIAL

## 2025-02-17 VITALS
HEART RATE: 62 BPM | SYSTOLIC BLOOD PRESSURE: 153 MMHG | DIASTOLIC BLOOD PRESSURE: 88 MMHG | WEIGHT: 242 LBS | BODY MASS INDEX: 34.65 KG/M2 | HEIGHT: 70 IN

## 2025-02-17 DIAGNOSIS — I25.810 CORONARY ARTERY DISEASE INVOLVING CORONARY BYPASS GRAFT OF NATIVE HEART WITHOUT ANGINA PECTORIS: ICD-10-CM

## 2025-02-17 DIAGNOSIS — I10 ESSENTIAL HYPERTENSION: ICD-10-CM

## 2025-02-17 DIAGNOSIS — I48.0 AF (PAROXYSMAL ATRIAL FIBRILLATION): ICD-10-CM

## 2025-02-17 DIAGNOSIS — E78.2 MIXED HYPERLIPIDEMIA: ICD-10-CM

## 2025-02-17 DIAGNOSIS — I47.20 VENTRICULAR TACHYCARDIA: ICD-10-CM

## 2025-02-17 DIAGNOSIS — I25.5 ISCHEMIC CARDIOMYOPATHY: Primary | ICD-10-CM

## 2025-02-17 PROCEDURE — 3079F DIAST BP 80-89 MM HG: CPT | Performed by: FAMILY MEDICINE

## 2025-02-17 PROCEDURE — 3077F SYST BP >= 140 MM HG: CPT | Performed by: FAMILY MEDICINE

## 2025-02-17 PROCEDURE — 99214 OFFICE O/P EST MOD 30 MIN: CPT | Performed by: FAMILY MEDICINE

## 2025-02-17 RX ORDER — SPIRONOLACTONE 25 MG/1
25 TABLET ORAL DAILY
Qty: 90 TABLET | Refills: 3 | Status: SHIPPED | OUTPATIENT
Start: 2025-02-17

## 2025-02-17 NOTE — LETTER
February 25, 2025     KARLO Ramires  [address] [facility] [address]    Patient: Luis Farias   YOB: 1965   Date of Visit: 2/17/2025       Dear KARLO Ramires    Luis Farias was in my office today. Below is a copy of my note.    If you have questions, please do not hesitate to call me. I look forward to following Luis along with you.         Sincerely,        KARLO James        CC: Justin Oleary MD    Chief Complaint  Follow-up and Coronary Artery Disease    Subjective       History of Present Illness  Luis Farias presents to [facility] CARDIOLOG[facility]   Mr. Farias  Is a 60-year-old male coming in for cardiac follow-up.  Blood pressures have been improving since last office visit, but he still has some mildly elevated readings at times.  He continues to have some mild symptoms of shortness of breath but it has improved significantly.,  He was scheduled to meet with denies any chest pain or pressure.  Dr. Bahena at the advanced heart failure clinic, but due to issues with insurance he canceled this appointment, but his insurance  issues have been resolved, and he plans to reschedule.    Past History:     Coronary artery disease : Previous coronary artery bypass grafting in 2014.  Presented with chest pain/non-STEMI on 6/10/2024.  Developed V. tach with cardiac arrest in the hospital requiring brief CPR and intubation.  Urgent cardiac cath showed 100% occlusion of SVG to diagonal branch, which was culprit lesion.  Underwent aspirational ectomy coronary angioplasty with stent placement.  Developed cardiogenic shock, transferred to [facility].  He underwent staged angioplasty of the native left circumflex artery with drug-eluting stents on 6/18/2024 at [facility].  LIMA graft to LAD and SVG to RPDA are patent.     Ischemic cardiomyopathy.  Index LV gram ejection fraction 20%, improved to 30 to 35% on discharge.     Paroxysmal atrial fibrillation :  Detected following myocardial infarction, on anticoagulation with Eliquis     Mixed hyperlipidemia, intolerance to statins    Past Medical History:   Diagnosis Date   • CHF (congestive heart failure)    • Coronary artery disease    • Hyperlipidemia    • Hypertension    • NSTEMI (non-ST elevated myocardial infarction) 06/09/2024       Allergies   Allergen Reactions   • Atorvastatin Hives   • Lisinopril Cough        Past Surgical History:   Procedure Laterality Date   • CARDIAC CATHETERIZATION N/A 6/10/2024    Procedure: Left Heart Cath;  Surgeon: Raimundo Sow MD;  Location: [facility] CATH INVASIVE LOCATION;  Service: Cardiovascular;  Laterality: N/A;   • CARDIAC CATHETERIZATION N/A 6/10/2024    Procedure: Percutaneous Coronary Intervention;  Surgeon: Raimundo Sow MD;  Location: [facility] INVASIVE LOCATION;  Service: Cardiovascular;  Laterality: N/A;        Social History  He  reports that he has never smoked. He has never used smokeless tobacco. He reports that he does not drink alcohol and does not use drugs.    Family History  His family history is not on file.       Current Outpatient Medications on File Prior to Visit   Medication Sig   • amiodarone (PACERONE) 100 MG tablet TAKE 1 TABLET BY MOUTH EVERY DAY   • clopidogrel (PLAVIX) 75 MG tablet Take 1 tablet by mouth Daily.   • Eliquis 5 MG tablet tablet Take 1 tablet by mouth Every 12 (Twelve) Hours.   • Evolocumab (REPATHA) solution auto-injector SureClick injection Inject 1 mL under the skin into the appropriate area as directed Every 14 (Fourteen) Days.   • famotidine (PEPCID) 40 MG tablet Take 1 tablet by mouth Daily.   • hydrALAZINE (APRESOLINE) 25 MG tablet Take 1 tablet by mouth 2 (Two) Times a Day.   • metoprolol succinate XL (TOPROL-XL) 50 MG 24 hr tablet Take 1 tablet by mouth Daily.   • sacubitril-valsartan (ENTRESTO)  MG tablet Take 1 tablet by mouth 2 (Two) Times a Day.     No current facility-administered medications on file prior  "to visit.         Review of Systems   Constitutional:  Positive for fatigue.   Respiratory:  Positive for shortness of breath. Negative for cough and chest tightness.    Cardiovascular:  Negative for chest pain, palpitations and leg swelling.   Gastrointestinal:  Negative for nausea and vomiting.   Neurological:  Negative for dizziness and syncope.        Objective  Vitals:    02/17/25 1436   BP: 153/88   Pulse: 62   Weight: 110 kg (242 lb)   Height: 177.8 cm (70\")         Physical Exam  General : Alert, awake, no acute distress  Neck : Supple, no carotid bruit, no jugular venous distention  CVS : Regular rate and rhythm, no murmur, no rubs or gallops  Lungs: Clear to auscultation bilaterally, no crackles or rhonchi  Abdomen: Soft, nontender, bowel sounds active  Extremities: Warm, well-perfused, no pedal edema      Result Review    The following data was reviewed by KARLO James  proBNP   Date Value Ref Range Status   06/09/2024 191.2 0.0 - 900.0 pg/mL Final     CMP          6/9/2024    14:48 6/10/2024    01:12 6/10/2024    03:16   CMP   Glucose 120  149  262    BUN 17  18  20    Creatinine 1.38  1.03  1.30    EGFR 59.6  84.7  64.1    Sodium 142  138  140    Potassium 4.1  3.8  4.1    Chloride 103  104  101    Calcium 9.5  8.4  8.4    Total Protein 6.5   6.3    Albumin 4.4   4.0    Globulin 2.1   2.3    Total Bilirubin 0.8   1.0    Alkaline Phosphatase 67   67    AST (SGOT) 41   249    ALT (SGPT) 41   97    Albumin/Globulin Ratio 2.1   1.7    BUN/Creatinine Ratio 12.3  17.5  15.4    Anion Gap 15.4  10.8  18.9      CBC w/diff          6/9/2024    14:48 6/10/2024    01:12 6/10/2024    03:16   CBC w/Diff   WBC 14.03  14.34  25.16    RBC 5.26  5.09  5.55    Hemoglobin 15.3  15.0  16.3    Hematocrit 43.8  43.1  48.7    MCV 83.3  84.7  87.7    MCH 29.1  29.5  29.4    MCHC 34.9  34.8  33.5    RDW 12.5  13.0  13.1    Platelets 223  209  302    Neutrophil Rel % 79.3  79.4  62.9    Immature Granulocyte Rel % 0.4  0.3 " " 0.9    Lymphocyte Rel % 12.9  13.5  26.1    Monocyte Rel % 6.6  6.5  9.6    Eosinophil Rel % 0.4  0.1  0.1    Basophil Rel % 0.4  0.2  0.4       Lab Results   Component Value Date    TSH 1.030 06/09/2024      No results found for: \"FREET4\"   No results found for: \"DDIMERQUANT\"  Magnesium   Date Value Ref Range Status   06/10/2024 1.7 1.6 - 2.6 mg/dL Final      No results found for: \"DIGOXIN\"   Lab Results   Component Value Date    TROPONINT 2,313 (C) 06/10/2024           Lipid Panel          6/10/2024    01:12   Lipid Panel   Total Cholesterol 168    Triglycerides 116    HDL Cholesterol 41    VLDL Cholesterol 21    LDL Cholesterol  106    LDL/HDL Ratio 2.53      Scanned labs from PCP office .    Results for orders placed during the hospital encounter of 12/17/24    Adult Transthoracic Echo Complete W/ Cont if Necessary Per Protocol    Interpretation Summary  •  Left ventricular systolic function is severely decreased with severe hypokinesis of apex, anterior septum and distal anterior wall. Calculated left ventricular EF = 34.3%  •  The left atrial cavity is mild to moderately dilated.  •  There is diastolic dysfunction of the left ventricle.  •  Unable to calculate pulmonary artery systolic pressure due to incomplete TR Doppler envelope.             Assessment and Plan   Diagnoses and all orders for this visit:    1. Ischemic cardiomyopathy (Primary)  Assessment & Plan:  Stable NYHA class II symptoms at this time, continue current regiment with Entresto, metoprolol, and we will add spironolactone.  He was unable to tolerate SGLT2's due to reoccurring yeast infection.  He had to cancel his recent appointment with Dr. Bahena, due to insurance issues, but this has been resolved, and he plans to reschedule.    Orders:  -     Basic Metabolic Panel; Future    2. Coronary artery disease involving coronary bypass graft of native heart without angina pectoris  Assessment & Plan:  He is stable without anginal symptoms.  " Shortness of breath improved significantly after switching to Plavix.  Continue Plavix, beta-blockers and Repatha.  He is unable to tolerate statin therapy.      3. Ventricular tachycardia  Assessment & Plan:  He had ventricular tachycardia in the setting of acute coronary syndrome drome and cardiac arrest, felt most likely provoked by ischemia.  We will continue amiodarone at this time, and will see what his evaluation looks like with the advanced heart heart failure clinic before stopping.  Will go ahead and recheck a TSH level for monitoring.    Orders:  -     TSH; Future    4. AF (paroxysmal atrial fibrillation)  Assessment & Plan:  He is in sinus rhythm without any complaints of palpitations.  Continue metoprolol for rate and rhythm control, and chronic anticoagulation with Eliquis.    Orders:  -     TSH; Future    5. Essential hypertension  Assessment & Plan:  Blood pressures have improved, but still having some mildly elevated blood pressures.  We will continue his current regiment, and add spironolactone.  Advised him he may need to hold the hydralazine while he starts the spironolactone to make sure he is good control of his BP his blood pressures do not get low.  Continue current dose of Entresto, metoprolol, and send BP log in the next 2 weeks.  We will recheck a BMP to make sure he does not have hyperkalemia after adding spironolactone      6. Mixed hyperlipidemia  Assessment & Plan:  LDL below goal on Repatha, continue the same.      Other orders  -     spironolactone (ALDACTONE) 25 MG tablet; Take 1 tablet by mouth Daily.  Dispense: 90 tablet; Refill: 3                Follow Up   Return in about 3 months (around 5/17/2025) for with Dr. Sow.    Patient was given instructions and counseling regarding his condition or for health maintenance advice. Please see specific information pulled into the AVS if appropriate.     Signed,  Dalia Lin, APRN  02/17/2025     Dictated Utilizing Dragon Dictation:  Please note that portions of this note were completed with a voice recognition program.  Part of this note may be an electronic transcription/translation of spoken language to printed text using the Dragon Dictation System.

## 2025-02-17 NOTE — PROGRESS NOTES
Chief Complaint  Follow-up and Coronary Artery Disease    Subjective        History of Present Illness  Luis Farias presents to [facility] CARDIOLOGY   Mr. Farias  Is a 60-year-old male coming in for cardiac follow-up.  Blood pressures have been improving since last office visit, but he still has some mildly elevated readings at times.  He continues to have some mild symptoms of shortness of breath but it has improved significantly.,  He was scheduled to meet with denies any chest pain or pressure.  Dr. Bahena at the advanced heart failure clinic, but due to issues with insurance he canceled this appointment, but his insurance  issues have been resolved, and he plans to reschedule.    Past History:     Coronary artery disease : Previous coronary artery bypass grafting in 2014.  Presented with chest pain/non-STEMI on 6/10/2024.  Developed V. tach with cardiac arrest in the hospital requiring brief CPR and intubation.  Urgent cardiac cath showed 100% occlusion of SVG to diagonal branch, which was culprit lesion.  Underwent aspirational ectomy coronary angioplasty with stent placement.  Developed cardiogenic shock, transferred to [facility].  He underwent staged angioplasty of the native left circumflex artery with drug-eluting stents on 6/18/2024 at [facility].  LIMA graft to LAD and SVG to RPDA are patent.     Ischemic cardiomyopathy.  Index LV gram ejection fraction 20%, improved to 30 to 35% on discharge.     Paroxysmal atrial fibrillation : Detected following myocardial infarction, on anticoagulation with Eliquis     Mixed hyperlipidemia, intolerance to statins    Past Medical History:   Diagnosis Date    CHF (congestive heart failure)     Coronary artery disease     Hyperlipidemia     Hypertension     NSTEMI (non-ST elevated myocardial infarction) 06/09/2024       Allergies   Allergen Reactions    Atorvastatin Hives    Lisinopril Cough        Past Surgical History:   Procedure  Laterality Date    CARDIAC CATHETERIZATION N/A 6/10/2024    Procedure: Left Heart Cath;  Surgeon: Raimundo Sow MD;  Location: [facility] CATH INVASIVE LOCATION;  Service: Cardiovascular;  Laterality: N/A;    CARDIAC CATHETERIZATION N/A 6/10/2024    Procedure: Percutaneous Coronary Intervention;  Surgeon: Raimundo Sow MD;  Location: [facility] INVASIVE LOCATION;  Service: Cardiovascular;  Laterality: N/A;        Social History  He  reports that he has never smoked. He has never used smokeless tobacco. He reports that he does not drink alcohol and does not use drugs.    Family History  His family history is not on file.       Current Outpatient Medications on File Prior to Visit   Medication Sig    amiodarone (PACERONE) 100 MG tablet TAKE 1 TABLET BY MOUTH EVERY DAY    clopidogrel (PLAVIX) 75 MG tablet Take 1 tablet by mouth Daily.    Eliquis 5 MG tablet tablet Take 1 tablet by mouth Every 12 (Twelve) Hours.    Evolocumab (REPATHA) solution auto-injector SureClick injection Inject 1 mL under the skin into the appropriate area as directed Every 14 (Fourteen) Days.    famotidine (PEPCID) 40 MG tablet Take 1 tablet by mouth Daily.    hydrALAZINE (APRESOLINE) 25 MG tablet Take 1 tablet by mouth 2 (Two) Times a Day.    metoprolol succinate XL (TOPROL-XL) 50 MG 24 hr tablet Take 1 tablet by mouth Daily.    sacubitril-valsartan (ENTRESTO)  MG tablet Take 1 tablet by mouth 2 (Two) Times a Day.     No current facility-administered medications on file prior to visit.         Review of Systems   Constitutional:  Positive for fatigue.   Respiratory:  Positive for shortness of breath. Negative for cough and chest tightness.    Cardiovascular:  Negative for chest pain, palpitations and leg swelling.   Gastrointestinal:  Negative for nausea and vomiting.   Neurological:  Negative for dizziness and syncope.        Objective   Vitals:    02/17/25 1436   BP: 153/88   Pulse: 62   Weight: 110 kg (242 lb)   Height: 177.8 cm  "(70\")         Physical Exam  General : Alert, awake, no acute distress  Neck : Supple, no carotid bruit, no jugular venous distention  CVS : Regular rate and rhythm, no murmur, no rubs or gallops  Lungs: Clear to auscultation bilaterally, no crackles or rhonchi  Abdomen: Soft, nontender, bowel sounds active  Extremities: Warm, well-perfused, no pedal edema      Result Review     The following data was reviewed by KARLO James  proBNP   Date Value Ref Range Status   06/09/2024 191.2 0.0 - 900.0 pg/mL Final     CMP          6/9/2024    14:48 6/10/2024    01:12 6/10/2024    03:16   CMP   Glucose 120  149  262    BUN 17  18  20    Creatinine 1.38  1.03  1.30    EGFR 59.6  84.7  64.1    Sodium 142  138  140    Potassium 4.1  3.8  4.1    Chloride 103  104  101    Calcium 9.5  8.4  8.4    Total Protein 6.5   6.3    Albumin 4.4   4.0    Globulin 2.1   2.3    Total Bilirubin 0.8   1.0    Alkaline Phosphatase 67   67    AST (SGOT) 41   249    ALT (SGPT) 41   97    Albumin/Globulin Ratio 2.1   1.7    BUN/Creatinine Ratio 12.3  17.5  15.4    Anion Gap 15.4  10.8  18.9      CBC w/diff          6/9/2024    14:48 6/10/2024    01:12 6/10/2024    03:16   CBC w/Diff   WBC 14.03  14.34  25.16    RBC 5.26  5.09  5.55    Hemoglobin 15.3  15.0  16.3    Hematocrit 43.8  43.1  48.7    MCV 83.3  84.7  87.7    MCH 29.1  29.5  29.4    MCHC 34.9  34.8  33.5    RDW 12.5  13.0  13.1    Platelets 223  209  302    Neutrophil Rel % 79.3  79.4  62.9    Immature Granulocyte Rel % 0.4  0.3  0.9    Lymphocyte Rel % 12.9  13.5  26.1    Monocyte Rel % 6.6  6.5  9.6    Eosinophil Rel % 0.4  0.1  0.1    Basophil Rel % 0.4  0.2  0.4       Lab Results   Component Value Date    TSH 1.030 06/09/2024      No results found for: \"FREET4\"   No results found for: \"DDIMERQUANT\"  Magnesium   Date Value Ref Range Status   06/10/2024 1.7 1.6 - 2.6 mg/dL Final      No results found for: \"DIGOXIN\"   Lab Results   Component Value Date    TROPONINT 2,313 (C) " 06/10/2024           Lipid Panel          6/10/2024    01:12   Lipid Panel   Total Cholesterol 168    Triglycerides 116    HDL Cholesterol 41    VLDL Cholesterol 21    LDL Cholesterol  106    LDL/HDL Ratio 2.53      Scanned labs from PCP office .    Results for orders placed during the hospital encounter of 12/17/24    Adult Transthoracic Echo Complete W/ Cont if Necessary Per Protocol    Interpretation Summary    Left ventricular systolic function is severely decreased with severe hypokinesis of apex, anterior septum and distal anterior wall. Calculated left ventricular EF = 34.3%    The left atrial cavity is mild to moderately dilated.    There is diastolic dysfunction of the left ventricle.    Unable to calculate pulmonary artery systolic pressure due to incomplete TR Doppler envelope.             Assessment and Plan   Diagnoses and all orders for this visit:    1. Ischemic cardiomyopathy (Primary)  Assessment & Plan:  Stable NYHA class II symptoms at this time, continue current regiment with Entresto, metoprolol, and we will add spironolactone.  He was unable to tolerate SGLT2's due to reoccurring yeast infection.  He had to cancel his recent appointment with Dr. Bahena, due to insurance issues, but this has been resolved, and he plans to reschedule.    Orders:  -     Basic Metabolic Panel; Future    2. Coronary artery disease involving coronary bypass graft of native heart without angina pectoris  Assessment & Plan:  He is stable without anginal symptoms.  Shortness of breath improved significantly after switching to Plavix.  Continue Plavix, beta-blockers and Repatha.  He is unable to tolerate statin therapy.      3. Ventricular tachycardia  Assessment & Plan:  He had ventricular tachycardia in the setting of acute coronary syndrome drome and cardiac arrest, felt most likely provoked by ischemia.  We will continue amiodarone at this time, and will see what his evaluation looks like with the advanced heart  heart failure clinic before stopping.  Will go ahead and recheck a TSH level for monitoring.    Orders:  -     TSH; Future    4. AF (paroxysmal atrial fibrillation)  Assessment & Plan:  He is in sinus rhythm without any complaints of palpitations.  Continue metoprolol for rate and rhythm control, and chronic anticoagulation with Eliquis.    Orders:  -     TSH; Future    5. Essential hypertension  Assessment & Plan:  Blood pressures have improved, but still having some mildly elevated blood pressures.  We will continue his current regiment, and add spironolactone.  Advised him he may need to hold the hydralazine while he starts the spironolactone to make sure he is good control of his BP his blood pressures do not get low.  Continue current dose of Entresto, metoprolol, and send BP log in the next 2 weeks.  We will recheck a BMP to make sure he does not have hyperkalemia after adding spironolactone      6. Mixed hyperlipidemia  Assessment & Plan:  LDL below goal on Repatha, continue the same.      Other orders  -     spironolactone (ALDACTONE) 25 MG tablet; Take 1 tablet by mouth Daily.  Dispense: 90 tablet; Refill: 3                Follow Up   Return in about 3 months (around 5/17/2025) for with Dr. Sow.    Patient was given instructions and counseling regarding his condition or for health maintenance advice. Please see specific information pulled into the AVS if appropriate.     Signed,  Dalia Lin, KARLO  02/17/2025     Dictated Utilizing Dragon Dictation: Please note that portions of this note were completed with a voice recognition program.  Part of this note may be an electronic transcription/translation of spoken language to printed text using the Dragon Dictation System.

## 2025-02-26 NOTE — ASSESSMENT & PLAN NOTE
Stable NYHA class II symptoms at this time, continue current regiment with Entresto, metoprolol, and we will add spironolactone.  He was unable to tolerate SGLT2's due to reoccurring yeast infection.  He had to cancel his recent appointment with Dr. Bahena, due to insurance issues, but this has been resolved, and he plans to reschedule.

## 2025-02-26 NOTE — ASSESSMENT & PLAN NOTE
Blood pressures have improved, but still having some mildly elevated blood pressures.  We will continue his current regiment, and add spironolactone.  Advised him he may need to hold the hydralazine while he starts the spironolactone to make sure he is good control of his BP his blood pressures do not get low.  Continue current dose of Entresto, metoprolol, and send BP log in the next 2 weeks.  We will recheck a BMP to make sure he does not have hyperkalemia after adding spironolactone

## 2025-02-26 NOTE — ASSESSMENT & PLAN NOTE
He is in sinus rhythm without any complaints of palpitations.  Continue metoprolol for rate and rhythm control, and chronic anticoagulation with Eliquis.

## 2025-02-26 NOTE — ASSESSMENT & PLAN NOTE
He had ventricular tachycardia in the setting of acute coronary syndrome drome and cardiac arrest, felt most likely provoked by ischemia.  We will continue amiodarone at this time, and will see what his evaluation looks like with the advanced heart heart failure clinic before stopping.  Will go ahead and recheck a TSH level for monitoring.

## 2025-02-26 NOTE — ASSESSMENT & PLAN NOTE
He is stable without anginal symptoms.  Shortness of breath improved significantly after switching to Plavix.  Continue Plavix, beta-blockers and Repatha.  He is unable to tolerate statin therapy.

## 2025-02-27 DIAGNOSIS — M79.10 MYALGIA: Primary | ICD-10-CM

## 2025-03-04 DIAGNOSIS — I10 ESSENTIAL HYPERTENSION: ICD-10-CM

## 2025-03-07 ENCOUNTER — TELEPHONE (OUTPATIENT)
Dept: CARDIOLOGY | Facility: CLINIC | Age: 60
End: 2025-03-07
Payer: COMMERCIAL

## 2025-03-07 ENCOUNTER — SPECIALTY PHARMACY (OUTPATIENT)
Dept: CARDIOLOGY | Facility: CLINIC | Age: 60
End: 2025-03-07
Payer: COMMERCIAL

## 2025-03-07 NOTE — TELEPHONE ENCOUNTER
The [facility] received a fax that requires your attention. The document has been indexed to the patient’s chart for your review.      Reason for sending: EXTERNAL MEDICAL RECORD NOTIFICATION     Documents Description: REPATHA [facility]-3.7.25    Name of Sender: [facility]     Date Indexed: 3.7.25

## 2025-03-31 RX ORDER — HYDRALAZINE HYDROCHLORIDE 25 MG/1
25 TABLET, FILM COATED ORAL 2 TIMES DAILY
Qty: 60 TABLET | Refills: 3 | Status: SHIPPED | OUTPATIENT
Start: 2025-03-31

## 2025-06-19 ENCOUNTER — OFFICE VISIT (OUTPATIENT)
Dept: CARDIOLOGY | Facility: CLINIC | Age: 60
End: 2025-06-19
Payer: COMMERCIAL

## 2025-06-19 VITALS
DIASTOLIC BLOOD PRESSURE: 91 MMHG | HEIGHT: 70 IN | SYSTOLIC BLOOD PRESSURE: 136 MMHG | BODY MASS INDEX: 34.07 KG/M2 | WEIGHT: 238 LBS | HEART RATE: 54 BPM

## 2025-06-19 DIAGNOSIS — I48.0 AF (PAROXYSMAL ATRIAL FIBRILLATION): ICD-10-CM

## 2025-06-19 DIAGNOSIS — I25.810 CORONARY ARTERY DISEASE INVOLVING CORONARY BYPASS GRAFT OF NATIVE HEART WITHOUT ANGINA PECTORIS: Primary | ICD-10-CM

## 2025-06-19 DIAGNOSIS — I47.20 VENTRICULAR TACHYCARDIA: ICD-10-CM

## 2025-06-19 DIAGNOSIS — I10 ESSENTIAL HYPERTENSION: ICD-10-CM

## 2025-06-19 DIAGNOSIS — I25.5 ISCHEMIC CARDIOMYOPATHY: ICD-10-CM

## 2025-06-19 DIAGNOSIS — Z95.810 PRESENCE OF AUTOMATIC (IMPLANTABLE) CARDIAC DEFIBRILLATOR: ICD-10-CM

## 2025-06-19 DIAGNOSIS — E78.2 MIXED HYPERLIPIDEMIA: ICD-10-CM

## 2025-06-19 PROCEDURE — 3075F SYST BP GE 130 - 139MM HG: CPT | Performed by: INTERNAL MEDICINE

## 2025-06-19 PROCEDURE — 99214 OFFICE O/P EST MOD 30 MIN: CPT | Performed by: INTERNAL MEDICINE

## 2025-06-19 PROCEDURE — 1159F MED LIST DOCD IN RCRD: CPT | Performed by: INTERNAL MEDICINE

## 2025-06-19 PROCEDURE — 3080F DIAST BP >= 90 MM HG: CPT | Performed by: INTERNAL MEDICINE

## 2025-06-19 PROCEDURE — 1160F RVW MEDS BY RX/DR IN RCRD: CPT | Performed by: INTERNAL MEDICINE

## 2025-06-19 RX ORDER — CARVEDILOL 25 MG/1
25 TABLET ORAL 2 TIMES DAILY WITH MEALS
COMMUNITY

## 2025-06-19 NOTE — LETTER
June 20, 2025     KARLO Ramires  [address] [address]    Patient: Luis Farias   YOB: 1965   Date of Visit: 6/19/2025       Dear KARLO Ramires    Luis Farias was in my office today. Below is a copy of my note.    If you have questions, please do not hesitate to call me. I look forward to following Luis along with you.         Sincerely,        Raimundo Sow MD        CC: No Recipients      CARDIOLOGY FOLLOW-UP PROGRESS NOTE        Chief Complaint  Follow-up, Coronary Artery Disease, Congestive Heart Failure, Hypertension, Hyperlipidemia, and Pacemaker Check    Subjective           Luis Farias presents to [facility] CARDIOLOGY  History of Present Illness    Mr Farias is here for a 3-month follow-up visit.  He is currently established with advanced heart failure clinic at [facility].  He underwent repeat echocardiogram in May which again showed ejection fraction of 30 to 35%.  He also had ICD placement primary prevention on 6/9/2025 without complications.  During previous visits with advanced heart failure clinic, metoprolol was changed to carvedilol.    Today, he reports feeling much better.  Shortness of breath and fatigue improved.  NYHA class II symptoms at this time.  Fairly active at baseline.  The ICD insertion site is healing well.      Past History:    Coronary artery disease : Previous coronary artery bypass grafting in 2014.  Presented with chest pain/non-STEMI on 6/10/2024.  Developed V. tach with cardiac arrest in the hospital requiring brief CPR and intubation.  Urgent cardiac cath showed 100% occlusion of SVG to diagonal branch, which was culprit lesion.  Underwent aspiration thrombectomy , followed by coronary angioplasty with stent placement.  Developed cardiogenic shock, transferred to [facility].  He underwent staged angioplasty of the native left circumflex artery with drug-eluting stents on 6/18/2024 at  "[facility].  LIMA graft to LAD and SVG to RPDA are patent.     Ischemic cardiomyopathy.  Index LV gram ejection fraction 20%, improved to 30 to 35% on discharge. S/p ICD placement by Dr. Liu on 6/9/2025     Paroxysmal atrial fibrillation : Detected following myocardial infarction, on anticoagulation with Eliquis     Mixed hyperlipidemia, intolerance to statins, now on Repatha    Medical History:  Past Medical History:   Diagnosis Date   • CHF (congestive heart failure)    • Coronary artery disease    • Hyperlipidemia    • Hypertension    • NSTEMI (non-ST elevated myocardial infarction) 06/09/2024       Social History: reports that he has never smoked. He has never used smokeless tobacco. He reports that he does not drink alcohol and does not use drugs.    Allergies: Atorvastatin and Lisinopril    Current Outpatient Medications on File Prior to Visit   Medication Sig   • amiodarone (PACERONE) 100 MG tablet TAKE 1 TABLET BY MOUTH EVERY DAY   • carvedilol (COREG) 25 MG tablet Take 1 tablet by mouth 2 (Two) Times a Day With Meals.   • clopidogrel (PLAVIX) 75 MG tablet Take 1 tablet by mouth Daily.   • Eliquis 5 MG tablet tablet Take 1 tablet by mouth Every 12 (Twelve) Hours.   • Evolocumab (REPATHA) solution auto-injector SureClick injection Inject 1 mL under the skin into the appropriate area as directed Every 14 (Fourteen) Days.   • hydrALAZINE (APRESOLINE) 25 MG tablet Take 1 tablet by mouth 2 (Two) Times a Day.   • sacubitril-valsartan (ENTRESTO)  MG tablet Take 1 tablet by mouth 2 (Two) Times a Day.       Review of Systems   Constitutional:  Positive for fatigue.   Respiratory:  Negative for cough, shortness of breath and wheezing.    Cardiovascular:  Negative for chest pain, palpitations and leg swelling.   Gastrointestinal:  Negative for nausea and vomiting.   Neurological:  Negative for dizziness and syncope.        Objective    /91   Pulse 54   Ht 177.8 cm (70\")   Wt 108 kg (238 lb)   " BMI 34.15 kg/m²       Physical Exam    General : Alert, awake, no acute distress  Neck : Supple, no carotid bruit, no jugular venous distention  CVS : Regular rate and rhythm, no murmur, rubs or gallops  Lungs: Clear to auscultation bilaterally, no crackles or rhonchi  Abdomen: Soft, nontender, bowel sounds heard in all 4 quadrants  Extremities: Warm, well-perfused, no pedal edema    Result Review:     The following data was reviewed by: Raimundo Sow MD on 06/19/2025:    Labs done on 3/19/2025 showed    Triglycerides  <=149 mg/dL 194 High    HDL  40 - 60 mg/dL 45   Cholesterol  <=199 mg/dL 100   CHOLESTEROL VLDL CALCULATION  mg/dL 39   CHOLESTEROL LDL CALCULATION  <=188.0 mg/dL 16.2   CHOLESTEROL/HDL RATIO  0.00 - 4.99 Ratio 2.22   LDL/HDL RATIO  Ratio 0.36       Component 06/09/25 05/21/25 03/19/25   Glucose 123 High  96 151 High    BUN 15 13 12   Creatinine 1.19 1.15 1.2   Sodium 138 138 139   Potassium 4.2 4.2 3.9   Chloride 108 107 105   CO2 25 23 25   Anion Gap 5 8 9   Calcium 8.7 8.8 8.7   BUN/Creatinine Ratio 12.6 11.3 10   EGFR 71  74  70          Data reviewed: Cardiology studies                                                 Assessment and Plan        Diagnoses and all orders for this visit:    1. Coronary artery disease involving coronary bypass graft of native heart without angina pectoris (Primary)  Assessment & Plan:  He is currently chest pain-free.  Will continue Plavix, beta-blockers.  He is unable to tolerate statins.      2. Ischemic cardiomyopathy  Assessment & Plan:  NYHA class II symptoms at this time.  Clinically not volume overloaded.  Most recent ejection fraction 30 to 35%.  Status post ICD placement.  Will continue Entresto, carvedilol.  Unable to tolerate spironolactone or SGLT2 inhibitors due to various side effects.  Also established with advanced heart failure clinic at [facility].      3. Essential hypertension  Assessment & Plan:  Pressure better controlled at  this time with addition of carvedilol.  Continue carvedilol, Entresto and hydralazine.      4. Mixed hyperlipidemia  Assessment & Plan:  He is unable to tolerate any statins.  Currently on Repatha.  Most recent LDL is 16.  Will continue with same.      5. AF (paroxysmal atrial fibrillation)  Assessment & Plan:  Regular rhythm on auscultation.  Continue Eliquis and beta-blockers.      6. Ventricular tachycardia  Assessment & Plan:  He had a cardiac arrest with ventricular tachycardia in the setting of acute coronary syndrome.  He is on amiodarone since then.  He is status post ICD placement.  Will continue maintenance amiodarone for now.  Labs showed normal liver enzymes and TSH.      7. Presence of automatic (implantable) cardiac defibrillator  Assessment & Plan:  Medtronic device, implanted on 6/9/2025 at [facility].  Surgical incision site is clean and healing well.  Steri-Strips removed.  Device interrogated.  Normally functioning device.  Mostly atrial paced.  Will transfer the remote to our practice.                Follow Up     Return in about 6 months (around 12/19/2025) for Next scheduled follow up.    Patient was given instructions and counseling regarding his condition or for health maintenance advice. Please see specific information pulled into the AVS if appropriate.

## 2025-06-20 PROBLEM — Z95.810 PRESENCE OF AUTOMATIC (IMPLANTABLE) CARDIAC DEFIBRILLATOR: Status: ACTIVE | Noted: 2025-06-20

## 2025-06-20 NOTE — ASSESSMENT & PLAN NOTE
He is currently chest pain-free.  Will continue Plavix, beta-blockers.  He is unable to tolerate statins.

## 2025-06-20 NOTE — ASSESSMENT & PLAN NOTE
He had a cardiac arrest with ventricular tachycardia in the setting of acute coronary syndrome.  He is on amiodarone since then.  He is status post ICD placement.  Will continue maintenance amiodarone for now.  Labs showed normal liver enzymes and TSH.

## 2025-06-20 NOTE — ASSESSMENT & PLAN NOTE
He is unable to tolerate any statins.  Currently on Repatha.  Most recent LDL is 16.  Will continue with same.

## 2025-06-20 NOTE — ASSESSMENT & PLAN NOTE
Pressure better controlled at this time with addition of carvedilol.  Continue carvedilol, Entresto and hydralazine.

## 2025-06-20 NOTE — ASSESSMENT & PLAN NOTE
NYHA class II symptoms at this time.  Clinically not volume overloaded.  Most recent ejection fraction 30 to 35%.  Status post ICD placement.  Will continue Entresto, carvedilol.  Unable to tolerate spironolactone or SGLT2 inhibitors due to various side effects.  Also established with advanced heart failure clinic at [facility].

## 2025-06-20 NOTE — ASSESSMENT & PLAN NOTE
Medtronic device, implanted on 6/9/2025 at [facility].  Surgical incision site is clean and healing well.  Steri-Strips removed.  Device interrogated.  Normally functioning device.  Mostly atrial paced.  Will transfer the remote to our practice.

## 2025-06-20 NOTE — PROGRESS NOTES
CARDIOLOGY FOLLOW-UP PROGRESS NOTE        Chief Complaint  Follow-up, Coronary Artery Disease, Congestive Heart Failure, Hypertension, Hyperlipidemia, and Pacemaker Check    Subjective            Luis Farias presents to [facility] CARDIOLOGY  History of Present Illness    Mr Farias is here for a 3-month follow-up visit.  He is currently established with advanced heart failure clinic at [facility].  He underwent repeat echocardiogram in May which again showed ejection fraction of 30 to 35%.  He also had ICD placement primary prevention on 6/9/2025 without complications.  During previous visits with advanced heart failure clinic, metoprolol was changed to carvedilol.    Today, he reports feeling much better.  Shortness of breath and fatigue improved.  NYHA class II symptoms at this time.  Fairly active at baseline.  The ICD insertion site is healing well.      Past History:    Coronary artery disease : Previous coronary artery bypass grafting in 2014.  Presented with chest pain/non-STEMI on 6/10/2024.  Developed V. tach with cardiac arrest in the hospital requiring brief CPR and intubation.  Urgent cardiac cath showed 100% occlusion of SVG to diagonal branch, which was culprit lesion.  Underwent aspiration thrombectomy , followed by coronary angioplasty with stent placement.  Developed cardiogenic shock, transferred to [facility].  He underwent staged angioplasty of the native left circumflex artery with drug-eluting stents on 6/18/2024 at [facility].  LIMA graft to LAD and SVG to RPDA are patent.     Ischemic cardiomyopathy.  Index LV gram ejection fraction 20%, improved to 30 to 35% on discharge. S/p ICD placement by Dr. Liu on 6/9/2025     Paroxysmal atrial fibrillation : Detected following myocardial infarction, on anticoagulation with Eliquis     Mixed hyperlipidemia, intolerance to statins, now on Repatha    Medical History:  Past Medical History:   Diagnosis  "Date    CHF (congestive heart failure)     Coronary artery disease     Hyperlipidemia     Hypertension     NSTEMI (non-ST elevated myocardial infarction) 06/09/2024       Social History: reports that he has never smoked. He has never used smokeless tobacco. He reports that he does not drink alcohol and does not use drugs.    Allergies: Atorvastatin and Lisinopril    Current Outpatient Medications on File Prior to Visit   Medication Sig    amiodarone (PACERONE) 100 MG tablet TAKE 1 TABLET BY MOUTH EVERY DAY    carvedilol (COREG) 25 MG tablet Take 1 tablet by mouth 2 (Two) Times a Day With Meals.    clopidogrel (PLAVIX) 75 MG tablet Take 1 tablet by mouth Daily.    Eliquis 5 MG tablet tablet Take 1 tablet by mouth Every 12 (Twelve) Hours.    Evolocumab (REPATHA) solution auto-injector SureClick injection Inject 1 mL under the skin into the appropriate area as directed Every 14 (Fourteen) Days.    hydrALAZINE (APRESOLINE) 25 MG tablet Take 1 tablet by mouth 2 (Two) Times a Day.    sacubitril-valsartan (ENTRESTO)  MG tablet Take 1 tablet by mouth 2 (Two) Times a Day.       Review of Systems   Constitutional:  Positive for fatigue.   Respiratory:  Negative for cough, shortness of breath and wheezing.    Cardiovascular:  Negative for chest pain, palpitations and leg swelling.   Gastrointestinal:  Negative for nausea and vomiting.   Neurological:  Negative for dizziness and syncope.        Objective     /91   Pulse 54   Ht 177.8 cm (70\")   Wt 108 kg (238 lb)   BMI 34.15 kg/m²       Physical Exam    General : Alert, awake, no acute distress  Neck : Supple, no carotid bruit, no jugular venous distention  CVS : Regular rate and rhythm, no murmur, rubs or gallops  Lungs: Clear to auscultation bilaterally, no crackles or rhonchi  Abdomen: Soft, nontender, bowel sounds heard in all 4 quadrants  Extremities: Warm, well-perfused, no pedal edema    Result Review :     The following data was reviewed by: Raimundo CANDELARIO" MD Magi on 06/19/2025:    Labs done on 3/19/2025 showed    Triglycerides  <=149 mg/dL 194 High    HDL  40 - 60 mg/dL 45   Cholesterol  <=199 mg/dL 100   CHOLESTEROL VLDL CALCULATION  mg/dL 39   CHOLESTEROL LDL CALCULATION  <=188.0 mg/dL 16.2   CHOLESTEROL/HDL RATIO  0.00 - 4.99 Ratio 2.22   LDL/HDL RATIO  Ratio 0.36       Component 06/09/25 05/21/25 03/19/25   Glucose 123 High  96 151 High    BUN 15 13 12   Creatinine 1.19 1.15 1.2   Sodium 138 138 139   Potassium 4.2 4.2 3.9   Chloride 108 107 105   CO2 25 23 25   Anion Gap 5 8 9   Calcium 8.7 8.8 8.7   BUN/Creatinine Ratio 12.6 11.3 10   EGFR 71  74  70          Data reviewed: Cardiology studies                                                 Assessment and Plan        Diagnoses and all orders for this visit:    1. Coronary artery disease involving coronary bypass graft of native heart without angina pectoris (Primary)  Assessment & Plan:  He is currently chest pain-free.  Will continue Plavix, beta-blockers.  He is unable to tolerate statins.      2. Ischemic cardiomyopathy  Assessment & Plan:  NYHA class II symptoms at this time.  Clinically not volume overloaded.  Most recent ejection fraction 30 to 35%.  Status post ICD placement.  Will continue Entresto, carvedilol.  Unable to tolerate spironolactone or SGLT2 inhibitors due to various side effects.  Also established with advanced heart failure clinic at [facility].      3. Essential hypertension  Assessment & Plan:  Pressure better controlled at this time with addition of carvedilol.  Continue carvedilol, Entresto and hydralazine.      4. Mixed hyperlipidemia  Assessment & Plan:  He is unable to tolerate any statins.  Currently on Repatha.  Most recent LDL is 16.  Will continue with same.      5. AF (paroxysmal atrial fibrillation)  Assessment & Plan:  Regular rhythm on auscultation.  Continue Eliquis and beta-blockers.      6. Ventricular tachycardia  Assessment & Plan:  He had a cardiac  arrest with ventricular tachycardia in the setting of acute coronary syndrome.  He is on amiodarone since then.  He is status post ICD placement.  Will continue maintenance amiodarone for now.  Labs showed normal liver enzymes and TSH.      7. Presence of automatic (implantable) cardiac defibrillator  Assessment & Plan:  [employer] device, implanted on 6/9/2025 at [facility].  Surgical incision site is clean and healing well.  Steri-Strips removed.  Device interrogated.  Normally functioning device.  Mostly atrial paced.  Will transfer the remote to our practice.                Follow Up     Return in about 6 months (around 12/19/2025) for Next scheduled follow up.    Patient was given instructions and counseling regarding his condition or for health maintenance advice. Please see specific information pulled into the AVS if appropriate.

## 2025-06-27 RX ORDER — HYDRALAZINE HYDROCHLORIDE 25 MG/1
25 TABLET, FILM COATED ORAL 2 TIMES DAILY
Qty: 180 TABLET | Refills: 1 | Status: SHIPPED | OUTPATIENT
Start: 2025-06-27

## 2025-07-17 ENCOUNTER — TELEPHONE (OUTPATIENT)
Dept: CARDIOLOGY | Facility: CLINIC | Age: 60
End: 2025-07-17
Payer: COMMERCIAL

## 2025-07-17 NOTE — TELEPHONE ENCOUNTER
Caller: Luis Farias    Relationship: Self    Best call back number: [phone]     What was the call regarding: PATIENT MISSED A CALL. UNABLE TO LOCATE A NOT IN THE CHART. HE IS UNSURE IF IT IS ABOUT HIM OR HIS SPOUSE. NO VOICEMAIL WAS LEFT.

## (undated) DEVICE — CATH F6 ST PIG 155 110CM 6SH: Brand: SUPER TORQUE

## (undated) DEVICE — KT CATH INDIGO RX ASP 140CM

## (undated) DEVICE — SI AVANTI+ 6F STD W/GW  NO OBT: Brand: AVANTI

## (undated) DEVICE — GW FC FLOP/TP .035 260CM 3MM

## (undated) DEVICE — CATH LAB PACK: Brand: MEDLINE INDUSTRIES, INC.

## (undated) DEVICE — CATH F6 ST JL 4 100CM: Brand: SUPERTORQUE

## (undated) DEVICE — 6F .070 JR 4 100CM: Brand: CORDIS

## (undated) DEVICE — HI-TORQUE WHISPER ES GUIDE WIRE .014 STRAIGHTTIP 3.0 CM X 190 CM: Brand: HI-TORQUE WHISPER

## (undated) DEVICE — NC TREK NEO™ CORONARY DILATATION CATHETER 3.25 MM X 15 MM / RAPID-EXCHANGE: Brand: NC TREK NEO™

## (undated) DEVICE — NC TREK NEO™ CORONARY DILATATION CATHETER 2.25 MM X 15 MM / RAPID-EXCHANGE: Brand: NC TREK NEO™

## (undated) DEVICE — CATH F6 ST JR 4 100CM: Brand: SUPERTORQUE

## (undated) DEVICE — HI-TORQUE BALANCE MIDDLEWEIGHT UNIVERSAL GUIDE WIRE .014 STRAIGHT TIP 3.0 CM X 190 CM: Brand: HI-TORQUE BALANCE MIDDLEWEIGHT UNIVERSAL

## (undated) DEVICE — NC TREK NEO™ CORONARY DILATATION CATHETER 3.50 MM X 12 MM / RAPID-EXCHANGE: Brand: NC TREK NEO™

## (undated) DEVICE — NC TREK NEO™ CORONARY DILATATION CATHETER 2.75 MM X 15 MM / RAPID-EXCHANGE: Brand: NC TREK NEO™